# Patient Record
Sex: MALE | Race: WHITE | Employment: UNEMPLOYED | URBAN - METROPOLITAN AREA
[De-identification: names, ages, dates, MRNs, and addresses within clinical notes are randomized per-mention and may not be internally consistent; named-entity substitution may affect disease eponyms.]

---

## 2020-06-18 ENCOUNTER — OFFICE VISIT (OUTPATIENT)
Dept: FAMILY MEDICINE CLINIC | Age: 50
End: 2020-06-18
Payer: COMMERCIAL

## 2020-06-18 VITALS
SYSTOLIC BLOOD PRESSURE: 104 MMHG | HEIGHT: 70 IN | DIASTOLIC BLOOD PRESSURE: 68 MMHG | RESPIRATION RATE: 16 BRPM | BODY MASS INDEX: 22.33 KG/M2 | OXYGEN SATURATION: 99 % | TEMPERATURE: 98 F | WEIGHT: 156 LBS | HEART RATE: 100 BPM

## 2020-06-18 PROCEDURE — 99386 PREV VISIT NEW AGE 40-64: CPT | Performed by: PHYSICIAN ASSISTANT

## 2020-06-18 PROCEDURE — 99406 BEHAV CHNG SMOKING 3-10 MIN: CPT | Performed by: PHYSICIAN ASSISTANT

## 2020-06-18 RX ORDER — VARENICLINE TARTRATE
KIT
Qty: 1 BOX | Refills: 0 | Status: SHIPPED | OUTPATIENT
Start: 2020-06-18 | End: 2020-11-29

## 2020-06-18 RX ORDER — LISDEXAMFETAMINE DIMESYLATE 70 MG/1
CAPSULE ORAL
COMMUNITY
Start: 2020-06-13 | End: 2021-06-29 | Stop reason: SDUPTHER

## 2020-06-18 RX ORDER — VARENICLINE TARTRATE 1 MG/1
1 TABLET, FILM COATED ORAL 2 TIMES DAILY
Qty: 60 TABLET | Refills: 4 | Status: SHIPPED | OUTPATIENT
Start: 2020-06-18 | End: 2020-11-29

## 2020-06-24 PROBLEM — Z00.00 ROUTINE HISTORY AND PHYSICAL EXAMINATION OF ADULT: Status: ACTIVE | Noted: 2020-06-24

## 2020-06-24 PROBLEM — F90.9 ADULT ADHD: Status: ACTIVE | Noted: 2020-06-24

## 2020-06-24 PROBLEM — Z72.0 TOBACCO ABUSE: Status: ACTIVE | Noted: 2020-06-24

## 2020-06-24 ASSESSMENT — ENCOUNTER SYMPTOMS
SHORTNESS OF BREATH: 0
WHEEZING: 0
CHEST TIGHTNESS: 0
COUGH: 0
COLOR CHANGE: 0
TROUBLE SWALLOWING: 0
ABDOMINAL DISTENTION: 0

## 2020-06-29 DIAGNOSIS — Z13.220 SCREENING CHOLESTEROL LEVEL: ICD-10-CM

## 2020-06-29 DIAGNOSIS — Z00.00 ROUTINE HISTORY AND PHYSICAL EXAMINATION OF ADULT: ICD-10-CM

## 2020-06-29 LAB
ALBUMIN SERPL-MCNC: 4 G/DL (ref 3.5–4.6)
ALP BLD-CCNC: 122 U/L (ref 35–104)
ALT SERPL-CCNC: 58 U/L (ref 0–41)
ANION GAP SERPL CALCULATED.3IONS-SCNC: 11 MEQ/L (ref 9–15)
AST SERPL-CCNC: 34 U/L (ref 0–40)
BASOPHILS ABSOLUTE: 0 K/UL (ref 0–0.2)
BASOPHILS RELATIVE PERCENT: 0.4 %
BILIRUB SERPL-MCNC: <0.2 MG/DL (ref 0.2–0.7)
BUN BLDV-MCNC: 9 MG/DL (ref 6–20)
CALCIUM SERPL-MCNC: 9.2 MG/DL (ref 8.5–9.9)
CHLORIDE BLD-SCNC: 100 MEQ/L (ref 95–107)
CHOLESTEROL, TOTAL: 191 MG/DL (ref 0–199)
CO2: 28 MEQ/L (ref 20–31)
CREAT SERPL-MCNC: 0.68 MG/DL (ref 0.7–1.2)
EOSINOPHILS ABSOLUTE: 0 K/UL (ref 0–0.7)
EOSINOPHILS RELATIVE PERCENT: 0.6 %
GFR AFRICAN AMERICAN: >60
GFR NON-AFRICAN AMERICAN: >60
GLOBULIN: 2.9 G/DL (ref 2.3–3.5)
GLUCOSE BLD-MCNC: 101 MG/DL (ref 70–99)
HCT VFR BLD CALC: 45.3 % (ref 42–52)
HDLC SERPL-MCNC: 38 MG/DL (ref 40–59)
HEMOGLOBIN: 15.1 G/DL (ref 14–18)
LDL CHOLESTEROL CALCULATED: 141 MG/DL (ref 0–129)
LYMPHOCYTES ABSOLUTE: 1.8 K/UL (ref 1–4.8)
LYMPHOCYTES RELATIVE PERCENT: 29.3 %
MCH RBC QN AUTO: 30 PG (ref 27–31.3)
MCHC RBC AUTO-ENTMCNC: 33.2 % (ref 33–37)
MCV RBC AUTO: 90.4 FL (ref 80–100)
MONOCYTES ABSOLUTE: 0.3 K/UL (ref 0.2–0.8)
MONOCYTES RELATIVE PERCENT: 5.3 %
NEUTROPHILS ABSOLUTE: 3.9 K/UL (ref 1.4–6.5)
NEUTROPHILS RELATIVE PERCENT: 64.4 %
PDW BLD-RTO: 15.3 % (ref 11.5–14.5)
PLATELET # BLD: 171 K/UL (ref 130–400)
POTASSIUM SERPL-SCNC: 4.1 MEQ/L (ref 3.4–4.9)
RBC # BLD: 5.01 M/UL (ref 4.7–6.1)
SODIUM BLD-SCNC: 139 MEQ/L (ref 135–144)
TOTAL PROTEIN: 6.9 G/DL (ref 6.3–8)
TRIGL SERPL-MCNC: 59 MG/DL (ref 0–150)
WBC # BLD: 6 K/UL (ref 4.8–10.8)

## 2020-07-07 RX ORDER — PRAVASTATIN SODIUM 20 MG
20 TABLET ORAL EVERY EVENING
Qty: 30 TABLET | Refills: 3 | Status: SHIPPED | OUTPATIENT
Start: 2020-07-07 | End: 2020-07-14 | Stop reason: SDUPTHER

## 2020-07-14 RX ORDER — PRAVASTATIN SODIUM 20 MG
20 TABLET ORAL EVERY EVENING
Qty: 30 TABLET | Refills: 3 | Status: SHIPPED | OUTPATIENT
Start: 2020-07-14 | End: 2020-07-16 | Stop reason: SDUPTHER

## 2020-07-14 NOTE — TELEPHONE ENCOUNTER
Rx request   Requested Prescriptions     Pending Prescriptions Disp Refills    pravastatin (PRAVACHOL) 20 MG tablet 30 tablet 3     Sig: Take 1 tablet by mouth every evening     LOV 6/18/2020  Next Visit Date:  Future Appointments   Date Time Provider Vanna Calvo   7/16/2020 11:15 AM DHARMESH Young 96 to the wrong pharmacy

## 2020-07-16 ENCOUNTER — OFFICE VISIT (OUTPATIENT)
Dept: FAMILY MEDICINE CLINIC | Age: 50
End: 2020-07-16
Payer: COMMERCIAL

## 2020-07-16 VITALS
DIASTOLIC BLOOD PRESSURE: 80 MMHG | BODY MASS INDEX: 22.9 KG/M2 | OXYGEN SATURATION: 98 % | HEIGHT: 70 IN | HEART RATE: 83 BPM | WEIGHT: 160 LBS | RESPIRATION RATE: 16 BRPM | SYSTOLIC BLOOD PRESSURE: 120 MMHG | TEMPERATURE: 97.4 F

## 2020-07-16 PROBLEM — F19.10 SUBSTANCE ABUSE (HCC): Status: ACTIVE | Noted: 2020-07-16

## 2020-07-16 PROBLEM — E78.2 MIXED HYPERLIPIDEMIA: Status: ACTIVE | Noted: 2020-07-16

## 2020-07-16 PROCEDURE — 99213 OFFICE O/P EST LOW 20 MIN: CPT | Performed by: PHYSICIAN ASSISTANT

## 2020-07-16 RX ORDER — PRAVASTATIN SODIUM 20 MG
20 TABLET ORAL EVERY EVENING
Qty: 30 TABLET | Refills: 5 | Status: SHIPPED | OUTPATIENT
Start: 2020-07-16 | End: 2021-04-15 | Stop reason: SDUPTHER

## 2020-07-16 RX ORDER — ARIPIPRAZOLE 10 MG/1
TABLET ORAL
COMMUNITY
Start: 2020-07-05 | End: 2021-06-29 | Stop reason: SDUPTHER

## 2020-07-16 RX ORDER — LAMOTRIGINE 100 MG/1
TABLET ORAL
COMMUNITY
Start: 2020-07-05 | End: 2021-07-01 | Stop reason: SDUPTHER

## 2020-07-16 ASSESSMENT — PATIENT HEALTH QUESTIONNAIRE - PHQ9
SUM OF ALL RESPONSES TO PHQ QUESTIONS 1-9: 0
SUM OF ALL RESPONSES TO PHQ QUESTIONS 1-9: 0
1. LITTLE INTEREST OR PLEASURE IN DOING THINGS: 0
SUM OF ALL RESPONSES TO PHQ9 QUESTIONS 1 & 2: 0
2. FEELING DOWN, DEPRESSED OR HOPELESS: 0

## 2020-07-16 ASSESSMENT — ENCOUNTER SYMPTOMS
CHEST TIGHTNESS: 0
WHEEZING: 0
COLOR CHANGE: 0
TROUBLE SWALLOWING: 0
COUGH: 0
ABDOMINAL DISTENTION: 0
SHORTNESS OF BREATH: 0

## 2020-07-16 NOTE — PROGRESS NOTES
Subjective  Lain Medina, 51 y.o. male presents today with:  Chief Complaint   Patient presents with    Discuss Medications     4 week follow up Never received his cholesterol medication     HPI  Phoebe Roy is in the office today for 4 week follow up. Last OV with me: 6/18/2020    Tobacco abuse. Started on chantix therapy 4 weeks ago. Previous tobacco use was heavy--2 PPD. Now down to 8-10 cigarettes/day. Doing well on medication. Denies side effects, adverse reaction. Would like to continue. Mixed hyperlipidemia. Started on pravastatin therapy for abnormal cholesterol. Has not started yet as it was sent to the wrong pharmacy. Needs it reordered today. Substance abuse. 7 years clean. Previously was abusing heroin, opioid pain pills. Asking for a suboxone clinic referral.   Patient is very worried about relapsing. Was previously on medication. No use of pills/illegal substances. Motivated to remain drug free. Lab results.    Results for orders placed or performed in visit on 06/29/20   Lipid Panel   Result Value Ref Range    Cholesterol, Total 191 0 - 199 mg/dL    Triglycerides 59 0 - 150 mg/dL    HDL 38 (L) 40 - 59 mg/dL    LDL Calculated 141 (H) 0 - 129 mg/dL   Comprehensive Metabolic Panel   Result Value Ref Range    Sodium 139 135 - 144 mEq/L    Potassium 4.1 3.4 - 4.9 mEq/L    Chloride 100 95 - 107 mEq/L    CO2 28 20 - 31 mEq/L    Anion Gap 11 9 - 15 mEq/L    Glucose 101 (H) 70 - 99 mg/dL    BUN 9 6 - 20 mg/dL    CREATININE 0.68 (L) 0.70 - 1.20 mg/dL    GFR Non-African American >60.0 >60    GFR  >60.0 >60    Calcium 9.2 8.5 - 9.9 mg/dL    Total Protein 6.9 6.3 - 8.0 g/dL    Alb 4.0 3.5 - 4.6 g/dL    Total Bilirubin <0.2 0.2 - 0.7 mg/dL    Alkaline Phosphatase 122 (H) 35 - 104 U/L    ALT 58 (H) 0 - 41 U/L    AST 34 0 - 40 U/L    Globulin 2.9 2.3 - 3.5 g/dL   CBC Auto Differential   Result Value Ref Range    WBC 6.0 4.8 - 10.8 K/uL    RBC 5.01 4.70 - 6.10 M/uL Hemoglobin 15.1 14.0 - 18.0 g/dL    Hematocrit 45.3 42.0 - 52.0 %    MCV 90.4 80.0 - 100.0 fL    MCH 30.0 27.0 - 31.3 pg    MCHC 33.2 33.0 - 37.0 %    RDW 15.3 (H) 11.5 - 14.5 %    Platelets 451 599 - 366 K/uL    Neutrophils % 64.4 %    Lymphocytes % 29.3 %    Monocytes % 5.3 %    Eosinophils % 0.6 %    Basophils % 0.4 %    Neutrophils Absolute 3.9 1.4 - 6.5 K/uL    Lymphocytes Absolute 1.8 1.0 - 4.8 K/uL    Monocytes Absolute 0.3 0.2 - 0.8 K/uL    Eosinophils Absolute 0.0 0.0 - 0.7 K/uL    Basophils Absolute 0.0 0.0 - 0.2 K/uL     Review of Systems   Constitutional: Negative for activity change, appetite change, chills, diaphoresis, fatigue, fever and unexpected weight change. HENT: Negative for congestion, postnasal drip and trouble swallowing. Eyes: Negative for visual disturbance. Respiratory: Negative for cough, chest tightness, shortness of breath and wheezing. Cardiovascular: Negative for chest pain, palpitations and leg swelling. Gastrointestinal: Negative for abdominal distention. Endocrine: Negative for polydipsia, polyphagia and polyuria. Genitourinary: Negative for difficulty urinating. Musculoskeletal: Negative for arthralgias. Skin: Negative for color change and rash. Neurological: Negative for dizziness, speech difficulty, weakness and light-headedness. Psychiatric/Behavioral: Negative for agitation, dysphoric mood and sleep disturbance. The patient is not nervous/anxious.       Past Medical History:   Diagnosis Date    Anxiety disorder 7/30/2015    Depression 7/30/2015    H/O drug abuse ([facility] [address]     Rehab and detox 4/2013, pain pills     Past Surgical History:   Procedure Laterality Date    CHOLECYSTECTOMY  6-7-13    lapchole with gram    HERNIA REPAIR Right 6/27/13    RIH    OTHER SURGICAL HISTORY Right 6/27/13    Diagnostic Laparoscopy, Repair of RIH with medium light perfix plus system     Social History     Socioeconomic History    Marital status:      Spouse name: Not on file    Number of children: Not on file    Years of education: Not on file    Highest education level: Not on file   Occupational History    Not on file   Social Needs    Financial resource strain: Not on file    Food insecurity     Worry: Not on file     Inability: Not on file    Transportation needs     Medical: Not on file     Non-medical: Not on file   Tobacco Use    Smoking status: Current Every Day Smoker     Packs/day: 1.50     Years: 30.00     Pack years: 45.00     Types: Cigarettes    Smokeless tobacco: Current User   Substance and Sexual Activity    Alcohol use: Not on file    Drug use: Never    Sexual activity: Not on file   Lifestyle    Physical activity     Days per week: Not on file     Minutes per session: Not on file    Stress: Not on file   Relationships    Social connections     Talks on phone: Not on file     Gets together: Not on file     Attends Yarsani service: Not on file     Active member of club or organization: Not on file     Attends meetings of clubs or organizations: Not on file     Relationship status: Not on file    Intimate partner violence     Fear of current or ex partner: Not on file     Emotionally abused: Not on file     Physically abused: Not on file     Forced sexual activity: Not on file   Other Topics Concern    Not on file   Social History Narrative    Not on file     Family History   Problem Relation Age of Onset    Cancer Mother     Depression Mother     Cancer Father         Colon    High Cholesterol Father      No Known Allergies  Current Outpatient Medications   Medication Sig Dispense Refill    ARIPiprazole (ABILIFY) 10 MG tablet       lamoTRIgine (LAMICTAL) 100 MG tablet       pravastatin (PRAVACHOL) 20 MG tablet Take 1 tablet by mouth every evening 30 tablet 5    VYVANSE 70 MG capsule TAKE 1 CAPSULE BY MOUTH EVERY MORNING      varenicline (CHANTIX CONTINUING MONTH ANALY) 1 MG tablet Take 1 tablet by mouth 2 times daily 60 tablet 4    varenicline (CHANTIX STARTING MONTH ANALY) 0.5 MG X 11 & 1 MG X 42 tablet Take by mouth. 1 box 0     No current facility-administered medications for this visit. PMH, Surgical Hx, Family Hx, and Social Hx reviewed and updated. Health Maintenance reviewed. Objective  Vitals:    07/16/20 1128   BP: 120/80   Site: Left Upper Arm   Position: Sitting   Cuff Size: Medium Adult   Pulse: 83   Resp: 16   Temp: 97.4 °F (36.3 °C)   TempSrc: Temporal   SpO2: 98%   Weight: 160 lb (72.6 kg)   Height: 5' 10\" (1.778 m)     BP Readings from Last 3 Encounters:   07/16/20 120/80   06/18/20 104/68   07/30/15 120/84     Wt Readings from Last 3 Encounters:   07/16/20 160 lb (72.6 kg)   06/18/20 156 lb (70.8 kg)   07/30/15 156 lb 8 oz (71 kg)     Physical Exam  Vitals signs reviewed. Constitutional:       General: He is not in acute distress. Appearance: Normal appearance. He is well-developed. He is not ill-appearing or diaphoretic. HENT:      Head: Normocephalic and atraumatic. Right Ear: Tympanic membrane, ear canal and external ear normal.      Left Ear: Tympanic membrane, ear canal and external ear normal.      Nose: Nose normal.      Mouth/Throat:      Mouth: Mucous membranes are moist.   Eyes:      Conjunctiva/sclera: Conjunctivae normal.   Neck:      Musculoskeletal: Normal range of motion. Cardiovascular:      Rate and Rhythm: Normal rate and regular rhythm. Pulmonary:      Effort: Pulmonary effort is normal.      Breath sounds: Normal breath sounds. Abdominal:      General: Abdomen is flat. Musculoskeletal: Normal range of motion. Skin:     General: Skin is warm and dry. Neurological:      General: No focal deficit present. Mental Status: He is alert and oriented to person, place, and time. Cranial Nerves: No cranial nerve deficit. Psychiatric:         Mood and Affect: Mood normal.         Behavior: Behavior normal.         Thought Content:  Thought content normal. Judgment: Judgment normal.      Comments: Opened up about his history of drug/opioid abuse. Very motivated to stay clean/sober. Asking for any advise/referral to suboxone clinic. Assessment & Plan   Krista Coronel was seen today for discuss medications. Diagnoses and all orders for this visit:    Substance abuse [facility])  Comments:  Discussed history; patient very motivated to stay sober. Provided number to addiction outreach and Dr. Makeda Fuentes in [place], [place]. Mixed hyperlipidemia  -     pravastatin (PRAVACHOL) 20 MG tablet; Take 1 tablet by mouth every evening    Tobacco abuse  Comments:  Continue chantix. Doing well. Repeat cholesterol in 12 weeks. Orders Placed This Encounter   Medications    pravastatin (PRAVACHOL) 20 MG tablet     Sig: Take 1 tablet by mouth every evening     Dispense:  30 tablet     Refill:  5     Medications Discontinued During This Encounter   Medication Reason    pravastatin (PRAVACHOL) 20 MG tablet REORDER     Return in about 3 months (around 10/16/2020) for follow up. Reviewed with the patient: current clinical status, medications, activities and diet. Side effects, adverse effects of the medication prescribed today, as well as treatment plan/ rationale and result expectations have been discussed with the patient who expresses understanding and desires to proceed. Close follow up to evaluate treatment results and for coordination of care. I have reviewed the patient's medical history in detail and updated the computerized patient record.     Pati Mustafa PA-C

## 2020-07-24 PROBLEM — Z00.00 ROUTINE HISTORY AND PHYSICAL EXAMINATION OF ADULT: Status: RESOLVED | Noted: 2020-06-24 | Resolved: 2020-07-24

## 2021-04-15 ENCOUNTER — OFFICE VISIT (OUTPATIENT)
Dept: FAMILY MEDICINE CLINIC | Age: 51
End: 2021-04-15
Payer: COMMERCIAL

## 2021-04-15 VITALS
HEIGHT: 71 IN | WEIGHT: 167 LBS | TEMPERATURE: 97.3 F | DIASTOLIC BLOOD PRESSURE: 80 MMHG | SYSTOLIC BLOOD PRESSURE: 132 MMHG | RESPIRATION RATE: 18 BRPM | HEART RATE: 94 BPM | BODY MASS INDEX: 23.38 KG/M2 | OXYGEN SATURATION: 100 %

## 2021-04-15 DIAGNOSIS — R60.0 LOWER EXTREMITY EDEMA: Primary | ICD-10-CM

## 2021-04-15 DIAGNOSIS — R20.2 NUMBNESS AND TINGLING OF BOTH FEET: ICD-10-CM

## 2021-04-15 DIAGNOSIS — B35.1 ONYCHOMYCOSIS OF TOENAIL: ICD-10-CM

## 2021-04-15 DIAGNOSIS — R20.0 NUMBNESS AND TINGLING OF BOTH FEET: ICD-10-CM

## 2021-04-15 DIAGNOSIS — E78.2 MIXED HYPERLIPIDEMIA: ICD-10-CM

## 2021-04-15 DIAGNOSIS — R06.02 SOB (SHORTNESS OF BREATH): ICD-10-CM

## 2021-04-15 DIAGNOSIS — B35.3 TINEA PEDIS OF BOTH FEET: ICD-10-CM

## 2021-04-15 DIAGNOSIS — F39 MOOD DISORDER (HCC): ICD-10-CM

## 2021-04-15 LAB
ALBUMIN SERPL-MCNC: 4.4 G/DL (ref 3.5–4.6)
ALP BLD-CCNC: 106 U/L (ref 35–104)
ALT SERPL-CCNC: 23 U/L (ref 0–41)
ANION GAP SERPL CALCULATED.3IONS-SCNC: 12 MEQ/L (ref 9–15)
AST SERPL-CCNC: 19 U/L (ref 0–40)
BASOPHILS ABSOLUTE: 0.1 K/UL (ref 0–0.2)
BASOPHILS RELATIVE PERCENT: 0.6 %
BILIRUB SERPL-MCNC: <0.2 MG/DL (ref 0.2–0.7)
BUN BLDV-MCNC: 5 MG/DL (ref 6–20)
CALCIUM SERPL-MCNC: 9.5 MG/DL (ref 8.5–9.9)
CHLORIDE BLD-SCNC: 103 MEQ/L (ref 95–107)
CHOLESTEROL, TOTAL: 140 MG/DL (ref 0–199)
CO2: 25 MEQ/L (ref 20–31)
CREAT SERPL-MCNC: 0.6 MG/DL (ref 0.7–1.2)
EOSINOPHILS ABSOLUTE: 0 K/UL (ref 0–0.7)
EOSINOPHILS RELATIVE PERCENT: 0.4 %
FOLATE: <2 NG/ML (ref 7.3–26.1)
GFR AFRICAN AMERICAN: >60
GFR NON-AFRICAN AMERICAN: >60
GLOBULIN: 2.6 G/DL (ref 2.3–3.5)
GLUCOSE BLD-MCNC: 103 MG/DL (ref 70–99)
HCT VFR BLD CALC: 45.6 % (ref 42–52)
HDLC SERPL-MCNC: 50 MG/DL (ref 40–59)
HEMOGLOBIN: 15.6 G/DL (ref 14–18)
LDL CHOLESTEROL CALCULATED: 80 MG/DL (ref 0–129)
LYMPHOCYTES ABSOLUTE: 2.8 K/UL (ref 1–4.8)
LYMPHOCYTES RELATIVE PERCENT: 26 %
MCH RBC QN AUTO: 30.4 PG (ref 27–31.3)
MCHC RBC AUTO-ENTMCNC: 34.1 % (ref 33–37)
MCV RBC AUTO: 89.2 FL (ref 80–100)
MONOCYTES ABSOLUTE: 0.6 K/UL (ref 0.2–0.8)
MONOCYTES RELATIVE PERCENT: 5.2 %
NEUTROPHILS ABSOLUTE: 7.3 K/UL (ref 1.4–6.5)
NEUTROPHILS RELATIVE PERCENT: 67.8 %
PDW BLD-RTO: 13.5 % (ref 11.5–14.5)
PLATELET # BLD: 197 K/UL (ref 130–400)
POTASSIUM SERPL-SCNC: 3.9 MEQ/L (ref 3.4–4.9)
RBC # BLD: 5.11 M/UL (ref 4.7–6.1)
SODIUM BLD-SCNC: 140 MEQ/L (ref 135–144)
TOTAL PROTEIN: 7 G/DL (ref 6.3–8)
TRIGL SERPL-MCNC: 51 MG/DL (ref 0–150)
VITAMIN B-12: 1134 PG/ML (ref 232–1245)
WBC # BLD: 10.8 K/UL (ref 4.8–10.8)

## 2021-04-15 PROCEDURE — 99215 OFFICE O/P EST HI 40 MIN: CPT | Performed by: PHYSICIAN ASSISTANT

## 2021-04-15 RX ORDER — PRAVASTATIN SODIUM 20 MG
20 TABLET ORAL EVERY EVENING
Qty: 90 TABLET | Refills: 4 | Status: SHIPPED | OUTPATIENT
Start: 2021-04-15 | End: 2022-01-25

## 2021-04-15 RX ORDER — CLOTRIMAZOLE AND BETAMETHASONE DIPROPIONATE 10; .64 MG/G; MG/G
CREAM TOPICAL
Qty: 45 G | Refills: 1 | Status: SHIPPED | OUTPATIENT
Start: 2021-04-15 | End: 2021-08-11

## 2021-04-15 RX ORDER — CLONIDINE HYDROCHLORIDE 0.1 MG/1
0.1 TABLET ORAL NIGHTLY
COMMUNITY
End: 2021-06-29 | Stop reason: SDUPTHER

## 2021-04-15 RX ORDER — HYDROCHLOROTHIAZIDE 12.5 MG/1
12.5 CAPSULE, GELATIN COATED ORAL EVERY MORNING
Qty: 30 CAPSULE | Refills: 1 | Status: SHIPPED | OUTPATIENT
Start: 2021-04-15 | End: 2021-04-29 | Stop reason: SDUPTHER

## 2021-04-15 SDOH — ECONOMIC STABILITY: FOOD INSECURITY: WITHIN THE PAST 12 MONTHS, THE FOOD YOU BOUGHT JUST DIDN'T LAST AND YOU DIDN'T HAVE MONEY TO GET MORE.: NOT ASKED

## 2021-04-15 SDOH — ECONOMIC STABILITY: INCOME INSECURITY: HOW HARD IS IT FOR YOU TO PAY FOR THE VERY BASICS LIKE FOOD, HOUSING, MEDICAL CARE, AND HEATING?: NOT HARD AT ALL

## 2021-04-15 SDOH — ECONOMIC STABILITY: FOOD INSECURITY: WITHIN THE PAST 12 MONTHS, YOU WORRIED THAT YOUR FOOD WOULD RUN OUT BEFORE YOU GOT MONEY TO BUY MORE.: NOT ASKED

## 2021-04-15 SDOH — ECONOMIC STABILITY: TRANSPORTATION INSECURITY
IN THE PAST 12 MONTHS, HAS THE LACK OF TRANSPORTATION KEPT YOU FROM MEDICAL APPOINTMENTS OR FROM GETTING MEDICATIONS?: NOT ASKED

## 2021-04-15 ASSESSMENT — PATIENT HEALTH QUESTIONNAIRE - PHQ9
SUM OF ALL RESPONSES TO PHQ QUESTIONS 1-9: 0
2. FEELING DOWN, DEPRESSED OR HOPELESS: 0

## 2021-04-15 ASSESSMENT — ENCOUNTER SYMPTOMS
SHORTNESS OF BREATH: 0
WHEEZING: 0
ABDOMINAL DISTENTION: 0
COUGH: 0
ABDOMINAL PAIN: 0
TROUBLE SWALLOWING: 0
COLOR CHANGE: 1
CHEST TIGHTNESS: 0

## 2021-04-15 NOTE — PROGRESS NOTES
for seizures, speech difficulty and weakness. Psychiatric/Behavioral: Negative for agitation, behavioral problems, confusion, dysphoric mood and sleep disturbance. The patient is not nervous/anxious.       Past Medical History:   Diagnosis Date    Anxiety disorder 7/30/2015    Depression 7/30/2015    H/O drug abuse ([facility] [address]     Rehab and detox 4/2013, pain pills     Past Surgical History:   Procedure Laterality Date    CHOLECYSTECTOMY  6-7-13    lapchole with gram    HERNIA REPAIR Right 6/27/13    RIH    OTHER SURGICAL HISTORY Right 6/27/13    Diagnostic Laparoscopy, Repair of RIH with medium light perfix plus system     Social History     Socioeconomic History    Marital status:      Spouse name: Not on file    Number of children: Not on file    Years of education: Not on file    Highest education level: Not on file   Occupational History    Not on file   Social Needs    Financial resource strain: Not hard at all   Radha-Lorene insecurity     Worry: Not on file     Inability: Not on file   FNZ needs     Medical: Not on file     Non-medical: Not on file   Tobacco Use    Smoking status: Current Every Day Smoker     Packs/day: 1.50     Years: 30.00     Pack years: 45.00     Types: Cigarettes    Smokeless tobacco: Current User   Substance and Sexual Activity    Alcohol use: Not on file    Drug use: Never    Sexual activity: Not on file   Lifestyle    Physical activity     Days per week: Not on file     Minutes per session: Not on file    Stress: Not on file   Relationships    Social connections     Talks on phone: Not on file     Gets together: Not on file     Attends Anabaptist service: Not on file     Active member of club or organization: Not on file     Attends meetings of clubs or organizations: Not on file     Relationship status: Not on file    Intimate partner violence     Fear of current or ex partner: Not on file     Emotionally abused: Not on file     Physically abused: Not on file     Forced sexual activity: Not on file   Other Topics Concern    Not on file   Social History Narrative    Not on file     Family History   Problem Relation Age of Onset    Cancer Mother     Depression Mother     Cancer Father         Colon    High Cholesterol Father      No Known Allergies  Current Outpatient Medications   Medication Sig Dispense Refill    cloNIDine (CATAPRES) 0.1 MG tablet Take 0.1 mg by mouth nightly      pravastatin (PRAVACHOL) 20 MG tablet Take 1 tablet by mouth every evening 90 tablet 4    hydroCHLOROthiazide (MICROZIDE) 12.5 MG capsule Take 1 capsule by mouth every morning 30 capsule 1    clotrimazole-betamethasone (LOTRISONE) 1-0.05 % cream Apply topically 2 times daily to feet. 45 g 1    ARIPiprazole (ABILIFY) 10 MG tablet       lamoTRIgine (LAMICTAL) 100 MG tablet       VYVANSE 70 MG capsule TAKE 1 CAPSULE BY MOUTH EVERY MORNING       No current facility-administered medications for this visit. PMH, Surgical Hx, Family Hx, and Social Hx reviewed and updated. Health Maintenance reviewed. Objective  Vitals:    04/15/21 1113   BP: 132/80   Pulse: 94   Resp: 18   Temp: 97.3 °F (36.3 °C)   TempSrc: Temporal   SpO2: 100%   Weight: 167 lb (75.8 kg)   Height: 5' 11\" (1.803 m)     BP Readings from Last 3 Encounters:   04/15/21 132/80   07/16/20 120/80   06/18/20 104/68     Wt Readings from Last 3 Encounters:   04/15/21 167 lb (75.8 kg)   07/16/20 160 lb (72.6 kg)   06/18/20 156 lb (70.8 kg)     Physical Exam  Vitals signs reviewed. Constitutional:       General: He is not in acute distress. Appearance: Normal appearance. He is not ill-appearing or toxic-appearing. HENT:      Head: Normocephalic and atraumatic. Right Ear: External ear normal.      Left Ear: External ear normal.      Nose: Nose normal.   Eyes:      Conjunctiva/sclera: Conjunctivae normal.   Cardiovascular:      Rate and Rhythm: Normal rate and regular rhythm. Heart sounds: No murmur. Pulmonary:      Effort: Pulmonary effort is normal.      Breath sounds: Normal breath sounds. No stridor. No wheezing or rhonchi. Musculoskeletal:         General: No tenderness. Right lower leg: Edema (2+ ) present. Left lower leg: Edema (2+) present. Feet:      Right foot:      Skin integrity: Skin breakdown and erythema present. Toenail Condition: Right toenails are abnormally thick and long. Fungal disease present. Left foot:      Skin integrity: Skin breakdown and erythema present. Toenail Condition: Left toenails are abnormally thick and long. Fungal disease present. Skin:     General: Skin is warm. Coloration: Skin is not pale. Findings: Erythema (bilateral feet with coolness noted to toes) and rash (desquamation of plantar surface, bilateral feet) present. Neurological:      General: No focal deficit present. Mental Status: He is alert and oriented to person, place, and time. Sensory: No sensory deficit. Motor: No weakness. Coordination: Coordination normal.      Gait: Gait normal.       Assessment & Plan   Sue Restrepo was seen today for leg swelling. Diagnoses and all orders for this visit:    Lower extremity edema  -     Farida Covington, NP, Interventional Radiology, [place]  -     hydroCHLOROthiazide (MICROZIDE) 12.5 MG capsule; Take 1 capsule by mouth every morning  -     ECHO Complete 2D W Doppler W Color; Future    SOB (shortness of breath)  -     ECHO Complete 2D W Doppler W Color; Future    Mixed hyperlipidemia  -     pravastatin (PRAVACHOL) 20 MG tablet; Take 1 tablet by mouth every evening  -     CBC Auto Differential; Future  -     Comprehensive Metabolic Panel; Future  -     Lipid Panel; Future    Tinea pedis of both feet  -     clotrimazole-betamethasone (LOTRISONE) 1-0.05 % cream; Apply topically 2 times daily to feet.     Onychomycosis of toenail  -     [facility] - Christiano Van DPM, Podiatry, [place]    Numbness and tingling of both feet  -     Vitamin B12 & Folate; Future    BMI 23.0-23.9, adult  -     CBC Auto Differential; Future  -     Comprehensive Metabolic Panel; Future  -     Lipid Panel; Future    Mood disorder ([address]    Further work-up today. Echocardiogram, IR referral.  Modify sodium intake. 2 week follow up with me.      Orders Placed This Encounter   Procedures    CBC Auto Differential     Standing Status:   Future     Number of Occurrences:   1     Standing Expiration Date:   4/15/2022    Comprehensive Metabolic Panel     Standing Status:   Future     Number of Occurrences:   1     Standing Expiration Date:   4/15/2022    Lipid Panel     Standing Status:   Future     Number of Occurrences:   1     Standing Expiration Date:   4/15/2022     Order Specific Question:   Is Patient Fasting?/# of Hours     Answer:   8+ hours    Vitamin B12 & Folate     Standing Status:   Future     Number of Occurrences:   1     Standing Expiration Date:   4/15/2022   [facility] - Ade Castro NP, Interventional Radiology, [place]     Referral Priority:   Routine     Referral Type:   Eval and Treat     Referral Reason:   Specialty Services Required     Referred to Provider:   SUBHA Orantes CNP     Requested Specialty:   Nurse Practitioner     Number of Visits Requested:   1    [facility] - Anita Rangel DPM, Podiatry, [place]     Referral Priority:   Routine     Referral Type:   Eval and Treat     Referral Reason:   Specialty Services Required     Referred to Provider:   Kourtney Sahu DPM     Requested Specialty:   Podiatry     Number of Visits Requested:   1    ECHO Complete 2D W Doppler W Color     Standing Status:   Future     Standing Expiration Date:   4/15/2022     Order Specific Question:   Reason for exam:     Answer:   BLE x 2-3 months, intermittent SOB     Orders Placed This Encounter   Medications    pravastatin (PRAVACHOL) 20 MG tablet     Sig: Take 1 tablet by mouth every evening     Dispense:  90 tablet     Refill:  4  hydroCHLOROthiazide (MICROZIDE) 12.5 MG capsule     Sig: Take 1 capsule by mouth every morning     Dispense:  30 capsule     Refill:  1    clotrimazole-betamethasone (LOTRISONE) 1-0.05 % cream     Sig: Apply topically 2 times daily to feet. Dispense:  45 g     Refill:  1     Medications Discontinued During This Encounter   Medication Reason    CHANTIX CONTINUING MONTH ANALY 1 MG tablet LIST CLEANUP    pravastatin (PRAVACHOL) 20 MG tablet REORDER     Return in about 2 weeks (around 4/29/2021) for follow up in office. Reviewed with the patient: current clinical status, medications, activities and diet. Side effects, adverse effects of the medication prescribed today, as well as treatment plan/ rationale and result expectations have been discussed with the patient who expresses understanding and desires to proceed. Close follow up to evaluate treatment results and for coordination of care. I have reviewed the patient's medical history in detail and updated the computerized patient record.     Pati Mustafa PA-C

## 2021-04-16 DIAGNOSIS — E53.8 FOLATE DEFICIENCY: Primary | ICD-10-CM

## 2021-04-16 RX ORDER — FOLIC ACID 1 MG/1
1 TABLET ORAL DAILY
Qty: 90 TABLET | Refills: 2 | Status: SHIPPED | OUTPATIENT
Start: 2021-04-16 | End: 2022-01-21

## 2021-04-21 ENCOUNTER — HOSPITAL ENCOUNTER (OUTPATIENT)
Dept: NON INVASIVE DIAGNOSTICS | Age: 51
Discharge: HOME | End: 2021-04-21
Payer: COMMERCIAL

## 2021-04-21 DIAGNOSIS — R06.02 SOB (SHORTNESS OF BREATH): ICD-10-CM

## 2021-04-21 DIAGNOSIS — R60.0 LOWER EXTREMITY EDEMA: ICD-10-CM

## 2021-04-21 LAB
LV EF: 55 %
LVEF MODALITY: NORMAL

## 2021-04-21 PROCEDURE — 93306 TTE W/DOPPLER COMPLETE: CPT

## 2021-04-29 ENCOUNTER — OFFICE VISIT (OUTPATIENT)
Dept: FAMILY MEDICINE CLINIC | Age: 51
End: 2021-04-29
Payer: COMMERCIAL

## 2021-04-29 VITALS
DIASTOLIC BLOOD PRESSURE: 76 MMHG | WEIGHT: 164 LBS | HEIGHT: 70 IN | SYSTOLIC BLOOD PRESSURE: 118 MMHG | OXYGEN SATURATION: 98 % | HEART RATE: 86 BPM | BODY MASS INDEX: 23.48 KG/M2 | TEMPERATURE: 98 F | RESPIRATION RATE: 18 BRPM

## 2021-04-29 DIAGNOSIS — H10.13 ALLERGIC CONJUNCTIVITIS OF BOTH EYES: ICD-10-CM

## 2021-04-29 DIAGNOSIS — R20.0 NUMBNESS AND TINGLING OF BOTH FEET: ICD-10-CM

## 2021-04-29 DIAGNOSIS — R60.0 LOWER EXTREMITY EDEMA: Primary | ICD-10-CM

## 2021-04-29 DIAGNOSIS — R20.2 NUMBNESS AND TINGLING OF BOTH FEET: ICD-10-CM

## 2021-04-29 DIAGNOSIS — Z72.0 TOBACCO ABUSE: ICD-10-CM

## 2021-04-29 DIAGNOSIS — E53.8 FOLATE DEFICIENCY: ICD-10-CM

## 2021-04-29 PROBLEM — R06.02 SOB (SHORTNESS OF BREATH): Status: RESOLVED | Noted: 2021-04-15 | Resolved: 2021-04-29

## 2021-04-29 PROCEDURE — 99214 OFFICE O/P EST MOD 30 MIN: CPT | Performed by: PHYSICIAN ASSISTANT

## 2021-04-29 RX ORDER — HYDROCHLOROTHIAZIDE 12.5 MG/1
12.5 CAPSULE, GELATIN COATED ORAL EVERY MORNING
Qty: 90 CAPSULE | Refills: 1 | Status: SHIPPED | OUTPATIENT
Start: 2021-04-29 | End: 2021-10-22

## 2021-04-29 RX ORDER — OLOPATADINE HYDROCHLORIDE 2 MG/ML
SOLUTION/ DROPS OPHTHALMIC
Qty: 2.5 ML | Refills: 1 | Status: SHIPPED | OUTPATIENT
Start: 2021-04-29 | End: 2021-07-30

## 2021-04-29 ASSESSMENT — ENCOUNTER SYMPTOMS
CHEST TIGHTNESS: 0
WHEEZING: 0
COUGH: 0
ABDOMINAL PAIN: 0
SHORTNESS OF BREATH: 0
ABDOMINAL DISTENTION: 0
TROUBLE SWALLOWING: 0
COLOR CHANGE: 1

## 2021-04-29 NOTE — PROGRESS NOTES
Subjective  Lashaun Brower, 53 y.o. male presents today with:  Chief Complaint   Patient presents with    Leg Swelling     2 week follow up patient states he is improving after HCTZ 12.5 Mg daily      HPI  Asiya Irby is in the office today for 2 week follow up. Last OV with me: 4/15/2021    LE edema. Was seen 2 weeks ago for worsening leg swelling. Echocardiogram ordered--normal.   IR referral--has appointment on 5/12 with Keesha Shelton CNP. Was started on 12.5 mg HCTZ. Has also reduced sodium intake. Noted great improvement in swelling. Folate def. Started on replacement therapy. Tobacco abuse. Restarted chantix therapy. Tolerating. Mild nausea after taking. Not eating with morning dose. Pruritis, eye/irritation  Seasonal, waking up with itchy, injected eyes. Seems to be worse with weather change. Denies FB sensation, drainage. Denies blurry vision/changes. Eyes are \"burning and irritated\".      Results for orders placed or performed in visit on 04/15/21   CBC Auto Differential   Result Value Ref Range    WBC 10.8 4.8 - 10.8 K/uL    RBC 5.11 4.70 - 6.10 M/uL    Hemoglobin 15.6 14.0 - 18.0 g/dL    Hematocrit 45.6 42.0 - 52.0 %    MCV 89.2 80.0 - 100.0 fL    MCH 30.4 27.0 - 31.3 pg    MCHC 34.1 33.0 - 37.0 %    RDW 13.5 11.5 - 14.5 %    Platelets 761 196 - 372 K/uL    Neutrophils % 67.8 %    Lymphocytes % 26.0 %    Monocytes % 5.2 %    Eosinophils % 0.4 %    Basophils % 0.6 %    Neutrophils Absolute 7.3 (H) 1.4 - 6.5 K/uL    Lymphocytes Absolute 2.8 1.0 - 4.8 K/uL    Monocytes Absolute 0.6 0.2 - 0.8 K/uL    Eosinophils Absolute 0.0 0.0 - 0.7 K/uL    Basophils Absolute 0.1 0.0 - 0.2 K/uL   Comprehensive Metabolic Panel   Result Value Ref Range    Sodium 140 135 - 144 mEq/L    Potassium 3.9 3.4 - 4.9 mEq/L    Chloride 103 95 - 107 mEq/L    CO2 25 20 - 31 mEq/L    Anion Gap 12 9 - 15 mEq/L    Glucose 103 (H) 70 - 99 mg/dL    BUN 5 (L) 6 - 20 mg/dL    CREATININE 0.60 (L) 0.70 - 1.20 mg/dL    GFR Non- >60.0 >60    GFR  >60.0 >60    Calcium 9.5 8.5 - 9.9 mg/dL    Total Protein 7.0 6.3 - 8.0 g/dL    Albumin 4.4 3.5 - 4.6 g/dL    Total Bilirubin <0.2 0.2 - 0.7 mg/dL    Alkaline Phosphatase 106 (H) 35 - 104 U/L    ALT 23 0 - 41 U/L    AST 19 0 - 40 U/L    Globulin 2.6 2.3 - 3.5 g/dL   Lipid Panel   Result Value Ref Range    Cholesterol, Total 140 0 - 199 mg/dL    Triglycerides 51 0 - 150 mg/dL    HDL 50 40 - 59 mg/dL    LDL Calculated 80 0 - 129 mg/dL   Vitamin B12 & Folate   Result Value Ref Range    Vitamin B-12 1134 232 - 1245 pg/mL    Folate <2.0 (L) 7.3 - 26.1 ng/mL     Review of Systems   Constitutional: Negative for activity change, appetite change, chills, diaphoresis, fatigue and fever. HENT: Negative for congestion, dental problem and trouble swallowing. Eyes: Negative for visual disturbance. Respiratory: Negative for cough, chest tightness, shortness of breath and wheezing. Cardiovascular: Positive for leg swelling (IMPROVED). Negative for chest pain and palpitations. Gastrointestinal: Negative for abdominal distention and abdominal pain. Genitourinary: Negative for difficulty urinating, dysuria, frequency, hematuria, penile pain, penile swelling, scrotal swelling, testicular pain and urgency. Skin: Positive for color change. Negative for pallor. Neurological: Positive for numbness (feet). Negative for seizures, speech difficulty and weakness. Psychiatric/Behavioral: Negative for agitation, behavioral problems, confusion, dysphoric mood and sleep disturbance. The patient is not nervous/anxious.         Past Medical History:   Diagnosis Date    Anxiety disorder 7/30/2015    Depression 7/30/2015    H/O drug abuse ([address]     Rehab and detox 4/2013, pain pills     Past Surgical History:   Procedure Laterality Date    CHOLECYSTECTOMY  6-7-13    lapchole with gram    [facility]ERN[facility]PA[facility]    OTHER SURGICAL HISTORY Right 6/27/13 Diagnostic Laparoscopy, Repair of RIH with medium light perfix plus system     Social History     Socioeconomic History    Marital status:      Spouse name: Not on file    Number of children: Not on file    Years of education: Not on file    Highest education level: Not on file   Occupational History    Not on file   Social Needs    Financial resource strain: Not hard at all   [employer] insecurity     Worry: Not on file     Inability: Not on file    Transportation needs     Medical: Not on file     Non-medical: Not on file   Tobacco Use    Smoking status: Current Every Day Smoker     Packs/day: 1.50     Years: 30.00     Pack years: 45.00     Types: Cigarettes    Smokeless tobacco: Current User   Substance and Sexual Activity    Alcohol use: Not on file    Drug use: Never    Sexual activity: Not on file   Lifestyle    Physical activity     Days per week: Not on file     Minutes per session: Not on file    Stress: Not on file   Relationships    Social connections     Talks on phone: Not on file     Gets together: Not on file     Attends Congregational service: Not on file     Active member of club or organization: Not on file     Attends meetings of clubs or organizations: Not on file     Relationship status: Not on file    Intimate partner violence     Fear of current or ex partner: Not on file     Emotionally abused: Not on file     Physically abused: Not on file     Forced sexual activity: Not on file   Other Topics Concern    Not on file   Social History Narrative    Not on file     Family History   Problem Relation Age of Onset    Cancer Mother     Depression Mother     Cancer Father         Colon    High Cholesterol Father      No Known Allergies  Current Outpatient Medications   Medication Sig Dispense Refill    hydroCHLOROthiazide (MICROZIDE) 12.5 MG capsule Take 1 capsule by mouth every morning 90 capsule 1    olopatadine (PATADAY) 0.2 % SOLN ophthalmic solution Instill 1 drop in each eye daily. 2.5 mL 1    folic acid (FOLVITE) 1 MG tablet Take 1 tablet by mouth daily 90 tablet 2    cloNIDine (CATAPRES) 0.1 MG tablet Take 0.1 mg by mouth nightly      pravastatin (PRAVACHOL) 20 MG tablet Take 1 tablet by mouth every evening 90 tablet 4    clotrimazole-betamethasone (LOTRISONE) 1-0.05 % cream Apply topically 2 times daily to feet. 45 g 1    ARIPiprazole (ABILIFY) 10 MG tablet       lamoTRIgine (LAMICTAL) 100 MG tablet       VYVANSE 70 MG capsule TAKE 1 CAPSULE BY MOUTH EVERY MORNING       No current facility-administered medications for this visit. PMH, Surgical Hx, Family Hx, and Social Hx reviewed and updated. Health Maintenance reviewed. Objective  Vitals:    04/29/21 0808   BP: 118/76   Pulse: 86   Resp: 18   Temp: 98 °F (36.7 °C)   TempSrc: Temporal   SpO2: 98%   Weight: 164 lb (74.4 kg)   Height: 5' 10\" (1.778 m)     BP Readings from Last 3 Encounters:   04/29/21 118/76   04/15/21 132/80   07/16/20 120/80     Wt Readings from Last 3 Encounters:   04/29/21 164 lb (74.4 kg)   04/15/21 167 lb (75.8 kg)   07/16/20 160 lb (72.6 kg)     Physical Exam  Vitals signs reviewed. Constitutional:       General: He is not in acute distress. Appearance: Normal appearance. He is not ill-appearing or toxic-appearing. HENT:      Head: Normocephalic and atraumatic. Right Ear: External ear normal.      Left Ear: External ear normal.      Nose: Nose normal.   Eyes:      Conjunctiva/sclera: Conjunctivae normal.   Cardiovascular:      Rate and Rhythm: Normal rate and regular rhythm. Heart sounds: No murmur. Pulmonary:      Effort: Pulmonary effort is normal.      Breath sounds: Normal breath sounds. No stridor. No wheezing or rhonchi. Musculoskeletal:         General: No tenderness. Right lower leg: Edema (trace, improved) present. Left lower leg: Edema (trace, improved) present. Feet:      Right foot:      Skin integrity: Skin breakdown and erythema present. Toenail Condition: Right toenails are abnormally thick and long. Fungal disease present. Left foot:      Skin integrity: Skin breakdown and erythema present. Toenail Condition: Left toenails are abnormally thick and long. Fungal disease present. Skin:     General: Skin is warm. Coloration: Skin is not pale. Findings: No erythema or rash. Neurological:      General: No focal deficit present. Mental Status: He is alert and oriented to person, place, and time. Sensory: No sensory deficit. Motor: No weakness. Coordination: Coordination normal.      Gait: Gait normal.       Assessment & Plan   Neville Harada was seen today for leg swelling. Diagnoses and all orders for this visit:    Lower extremity edema  Comments:  Improved with Na reduction. Continue 12.5 mg hctz. Orders:  -     hydroCHLOROthiazide (MICROZIDE) 12.5 MG capsule; Take 1 capsule by mouth every morning    Allergic conjunctivitis of both eyes  Comments:  start eye drop   Orders:  -     olopatadine (PATADAY) 0.2 % SOLN ophthalmic solution; Instill 1 drop in each eye daily. Tobacco abuse  Comments:  restarted chantix. Numbness and tingling of both feet  Comments:  Has IR consult 5/12. Monitor if improvement with folate replacement. Folate deficiency  Comments:  Started replacement. 3 month follow up. Orders Placed This Encounter   Medications    hydroCHLOROthiazide (MICROZIDE) 12.5 MG capsule     Sig: Take 1 capsule by mouth every morning     Dispense:  90 capsule     Refill:  1    olopatadine (PATADAY) 0.2 % SOLN ophthalmic solution     Sig: Instill 1 drop in each eye daily. Dispense:  2.5 mL     Refill:  1     Medications Discontinued During This Encounter   Medication Reason    hydroCHLOROthiazide (MICROZIDE) 12.5 MG capsule REORDER     No follow-ups on file. Reviewed with the patient: current clinical status, medications, activities and diet.      Side effects, adverse effects of the

## 2021-05-12 ENCOUNTER — INITIAL CONSULT (OUTPATIENT)
Dept: INTERVENTIONAL RADIOLOGY/VASCULAR | Age: 51
End: 2021-05-12
Payer: COMMERCIAL

## 2021-05-12 VITALS
BODY MASS INDEX: 23.53 KG/M2 | DIASTOLIC BLOOD PRESSURE: 76 MMHG | HEART RATE: 89 BPM | WEIGHT: 164 LBS | SYSTOLIC BLOOD PRESSURE: 118 MMHG

## 2021-05-12 DIAGNOSIS — I83.93 ASYMPTOMATIC VARICOSE VEINS OF LOWER EXTREMITY WITHOUT COMPLICATION, BILATERAL: ICD-10-CM

## 2021-05-12 DIAGNOSIS — R60.0 BILATERAL LEG EDEMA: Primary | ICD-10-CM

## 2021-05-12 PROCEDURE — 99244 OFF/OP CNSLTJ NEW/EST MOD 40: CPT | Performed by: NURSE PRACTITIONER

## 2021-05-12 ASSESSMENT — ENCOUNTER SYMPTOMS
NAUSEA: 0
BACK PAIN: 0
SHORTNESS OF BREATH: 0
GASTROINTESTINAL NEGATIVE: 1
RESPIRATORY NEGATIVE: 1
WHEEZING: 0
TROUBLE SWALLOWING: 0
EYES NEGATIVE: 1
ABDOMINAL PAIN: 0
COUGH: 0
ABDOMINAL DISTENTION: 0
DIARRHEA: 0
SORE THROAT: 0
VOMITING: 0
CONSTIPATION: 0

## 2021-05-12 NOTE — PROGRESS NOTES
Lily Rizzo, a male of 53 y.o. came to the office 5/12/2021. No chief complaint on file. 5/12/2021 HPI: Lily Rizzo is a pleasant male who presents to clinic for consultation at the request of PCP for evaluation of LE edema. Patient presents with symptoms of: mild lower leg chronic daily edema. Onset x 2 months. Placed on diuretic with improvement. Today is minimal edema. States he eats high sodium diet. Denies any LE pain, aching, fatigue or heaviness sensation. There are scattered small varicose veins to both legs that are asymptomatic. Leg elevation: daily with relief. Stocking use and dates: Has never done conservative therapy with compression stockings.    Denies claudication      Family History   Problem Relation Age of Onset    Cancer Mother     Depression Mother     Cancer Father         Colon    High Cholesterol Father        Past Surgical History:   Procedure Laterality Date    CHOLECYSTECTOMY  6-7-13    lapchole with gram    HERNIA REPAIR Right 6/27/13    [facility]    OTHER SURGICAL HISTORY Right 6/27/13    Diagnostic Laparoscopy, Repair of RIH with medium light perfix plus system        Past Medical History:   Diagnosis Date    Anxiety disorder 7/30/2015    Depression 7/30/2015    H/O drug abuse ([facility] [address]     Rehab and detox 4/2013, pain pills       Social History     Socioeconomic History    Marital status:      Spouse name: Not on file    Number of children: Not on file    Years of education: Not on file    Highest education level: Not on file   Occupational History    Not on file   Social Needs    Financial resource strain: Not hard at all   [employer] insecurity     Worry: Not on file     Inability: Not on file   [employer] needs     Medical: Not on file     Non-medical: Not on file   Tobacco Use    Smoking status: Current Every Day Smoker     Packs/day: 1.50     Years: 30.00     Pack years: 45.00     Types: Cigarettes    Smokeless tobacco: Current User Substance and Sexual Activity    Alcohol use: Not on file    Drug use: Never    Sexual activity: Not on file   Lifestyle    Physical activity     Days per week: Not on file     Minutes per session: Not on file    Stress: Not on file   Relationships    Social connections     Talks on phone: Not on file     Gets together: Not on file     Attends Congregation service: Not on file     Active member of club or organization: Not on file     Attends meetings of clubs or organizations: Not on file     Relationship status: Not on file    Intimate partner violence     Fear of current or ex partner: Not on file     Emotionally abused: Not on file     Physically abused: Not on file     Forced sexual activity: Not on file   Other Topics Concern    Not on file   Social History Narrative    Not on file       No Known Allergies    Current Outpatient Medications on File Prior to Visit   Medication Sig Dispense Refill    hydroCHLOROthiazide (MICROZIDE) 12.5 MG capsule Take 1 capsule by mouth every morning 90 capsule 1    olopatadine (PATADAY) 0.2 % SOLN ophthalmic solution Instill 1 drop in each eye daily. 2.5 mL 1    folic acid (FOLVITE) 1 MG tablet Take 1 tablet by mouth daily 90 tablet 2    cloNIDine (CATAPRES) 0.1 MG tablet Take 0.1 mg by mouth nightly      pravastatin (PRAVACHOL) 20 MG tablet Take 1 tablet by mouth every evening 90 tablet 4    clotrimazole-betamethasone (LOTRISONE) 1-0.05 % cream Apply topically 2 times daily to feet. 45 g 1    ARIPiprazole (ABILIFY) 10 MG tablet       lamoTRIgine (LAMICTAL) 100 MG tablet       VYVANSE 70 MG capsule TAKE 1 CAPSULE BY MOUTH EVERY MORNING       No current facility-administered medications on file prior to visit. Review of Systems   Constitutional: Negative. Negative for activity change, appetite change, chills, fatigue and fever. HENT: Negative. Negative for congestion, sore throat and trouble swallowing. Eyes: Negative. Respiratory: Negative. Negative for cough, shortness of breath and wheezing. Cardiovascular: Positive for leg swelling (chronic daily mild lower leg edema. ). Negative for chest pain and palpitations. Gastrointestinal: Negative. Negative for abdominal distention, abdominal pain, constipation, diarrhea, nausea and vomiting. Endocrine: Negative. Genitourinary: Negative. Negative for difficulty urinating, dysuria, hematuria and urgency. Musculoskeletal: Negative. Negative for back pain, neck pain and neck stiffness. Skin: Negative. Neurological: Negative for dizziness, speech difficulty, light-headedness, numbness and headaches. Hematological: Negative. Psychiatric/Behavioral: Negative. OBJECTIVE:  /76   Pulse 89   Wt 164 lb (74.4 kg)   BMI 23.53 kg/m²     Physical Exam  Constitutional:       General: He is not in acute distress. Appearance: Normal appearance. He is not ill-appearing. HENT:      Head: Normocephalic and atraumatic. Nose: No congestion. Neck:      Musculoskeletal: Neck supple. Cardiovascular:      Rate and Rhythm: Normal rate. Pulses: Normal pulses. Dorsalis pedis pulses are 2+ on the right side and 2+ on the left side. Posterior tibial pulses are 2+ on the right side and 2+ on the left side. Heart sounds: Normal heart sounds. Comments: Scattered varicose veins throughout both lower legs asymptomatic. Pulmonary:      Breath sounds: Examination of the right-lower field reveals wheezing. Examination of the left-lower field reveals wheezing. Wheezing present. Abdominal:      Palpations: Abdomen is soft. Musculoskeletal:         General: No tenderness or signs of injury. Right lower leg: Edema (trace lower leg) present. Left lower leg: Edema (trace lower leg) present. Skin:     Capillary Refill: Capillary refill takes 2 to 3 seconds. Findings: No bruising, erythema, lesion or rash.    Neurological:      Mental Status: He

## 2021-05-26 ENCOUNTER — OFFICE VISIT (OUTPATIENT)
Dept: INTERVENTIONAL RADIOLOGY/VASCULAR | Age: 51
End: 2021-05-26
Payer: COMMERCIAL

## 2021-05-26 VITALS
DIASTOLIC BLOOD PRESSURE: 62 MMHG | WEIGHT: 164 LBS | HEART RATE: 89 BPM | OXYGEN SATURATION: 99 % | HEIGHT: 70 IN | SYSTOLIC BLOOD PRESSURE: 123 MMHG | BODY MASS INDEX: 23.48 KG/M2

## 2021-05-26 DIAGNOSIS — I83.93 ASYMPTOMATIC VARICOSE VEINS OF BILATERAL LOWER EXTREMITIES: ICD-10-CM

## 2021-05-26 DIAGNOSIS — R60.0 BILATERAL LEG EDEMA: Primary | ICD-10-CM

## 2021-05-26 PROCEDURE — 99213 OFFICE O/P EST LOW 20 MIN: CPT | Performed by: NURSE PRACTITIONER

## 2021-05-26 RX ORDER — BUPRENORPHINE AND NALOXONE 8; 2 MG/1; MG/1
FILM, SOLUBLE BUCCAL; SUBLINGUAL
COMMUNITY
Start: 2021-05-18

## 2021-05-26 ASSESSMENT — ENCOUNTER SYMPTOMS
BACK PAIN: 0
RESPIRATORY NEGATIVE: 1
SORE THROAT: 0
NAUSEA: 0
COUGH: 0
ABDOMINAL DISTENTION: 0
SHORTNESS OF BREATH: 0
WHEEZING: 0
TROUBLE SWALLOWING: 0
CONSTIPATION: 0
DIARRHEA: 0
VOMITING: 0
ABDOMINAL PAIN: 0
EYES NEGATIVE: 1
GASTROINTESTINAL NEGATIVE: 1

## 2021-05-26 NOTE — PROGRESS NOTES
Judit Hernandez, a male of 53 y.o. came to the office 5/26/2021. Chief Complaint   Patient presents with    Other        PROGRESS NOTE:     SUBJECTIVE:     5/26/2021: Judit Hernandez returns for results of LE venous studies to evaluate for insufficiency. Reports LE edema has decreased. Has decreased his sodium intake and elevates LE's when sitting/lying down. This is trace edema to both distal legs now. He is otherwise in good health. 5/12/2021 HPI: Judit Hernandez is a pleasant male who presents to clinic for consultation at the request of PCP for evaluation of LE edema. Patient presents with symptoms of: mild lower leg chronic daily edema. Onset x 2 months. Placed on diuretic with improvement. Today is minimal edema. States he eats high sodium diet. Denies any LE pain, aching, fatigue or heaviness sensation. There are scattered small varicose veins to both legs that are asymptomatic. Leg elevation: daily with relief. Stocking use and dates: Has never done conservative therapy with compression stockings.    Denies claudication      Family History   Problem Relation Age of Onset    Cancer Mother     Depression Mother     Cancer Father         Colon    High Cholesterol Father        Past Surgical History:   Procedure Laterality Date    CHOLECYSTECTOMY  6-7-13    lapchole with gram    HERNIA REPAIR Right 6/27/13    [facility]    OTHER SURGICAL HISTORY Right 6/27/13    Diagnostic Laparoscopy, Repair of RIH with medium light perfix plus system        Past Medical History:   Diagnosis Date    Anxiety disorder 7/30/2015    Depression 7/30/2015    H/O drug abuse ([address]     Rehab and detox 4/2013, pain pills       Social History     Socioeconomic History    Marital status:      Spouse name: None    Number of children: None    Years of education: None    Highest education level: None   Occupational History    None   Tobacco Use    Smoking status: Current Every Day Smoker     Packs/day: 1.50     Years: 30.00     Pack years: 45.00     Types: Cigarettes    Smokeless tobacco: Current User   Substance and Sexual Activity    Alcohol use: None    Drug use: Never    Sexual activity: None   Other Topics Concern    None   Social History Narrative    None     Social Determinants of Health     Financial Resource Strain: Low Risk     Difficulty of Paying Living Expenses: Not hard at all   Food Insecurity:     Worried About [address] in the Last Year:     [address] [employer] in the Last Year:    Transportation Needs:     Lack of Transportation (Medical):  Lack of Transportation (Non-Medical):    Physical Activity:     Days of Exercise per Week:     Minutes of Exercise per Session:    Stress:     Feeling of Stress :    Social Connections:     Frequency of Communication with Friends and Family:     Frequency of Social Gatherings with Friends and Family:     Attends Synagogue Services:     Active Member of Clubs or Organizations:     Attends Club or Organization Meetings:     Marital Status:    Intimate Partner Violence:     Fear of Current or Ex-Partner:     Emotionally Abused:     Physically Abused:     Sexually Abused:        No Known Allergies    Current Outpatient Medications on File Prior to Visit   Medication Sig Dispense Refill    buprenorphine-naloxone (SUBOXONE) 8-2 MG FILM SL film DISSOLVE 1 (ONE) film UNDER THE TONGUE EVERY DAY      hydroCHLOROthiazide (MICROZIDE) 12.5 MG capsule Take 1 capsule by mouth every morning 90 capsule 1    olopatadine (PATADAY) 0.2 % SOLN ophthalmic solution Instill 1 drop in each eye daily. 2.5 mL 1    folic acid (FOLVITE) 1 MG tablet Take 1 tablet by mouth daily 90 tablet 2    cloNIDine (CATAPRES) 0.1 MG tablet Take 0.1 mg by mouth nightly      pravastatin (PRAVACHOL) 20 MG tablet Take 1 tablet by mouth every evening 90 tablet 4    clotrimazole-betamethasone (LOTRISONE) 1-0.05 % cream Apply topically 2 times daily to feet.  45 g 1   

## 2021-06-09 LAB
ALT SERPL-CCNC: 14 U/L (ref 0–41)
AST SERPL-CCNC: 15 U/L (ref 0–40)

## 2021-06-29 DIAGNOSIS — F90.9 ADULT ADHD: Primary | ICD-10-CM

## 2021-06-29 RX ORDER — LAMOTRIGINE 100 MG/1
100 TABLET ORAL
Qty: 30 TABLET | OUTPATIENT
Start: 2021-06-29

## 2021-06-29 RX ORDER — ARIPIPRAZOLE 10 MG/1
10 TABLET ORAL DAILY
Qty: 30 TABLET | Refills: 5 | Status: SHIPPED | OUTPATIENT
Start: 2021-06-29 | End: 2022-01-25

## 2021-06-29 RX ORDER — CLONIDINE HYDROCHLORIDE 0.1 MG/1
0.1 TABLET ORAL NIGHTLY
Qty: 60 TABLET | Refills: 5 | Status: SHIPPED | OUTPATIENT
Start: 2021-06-29 | End: 2021-08-02

## 2021-06-29 RX ORDER — LISDEXAMFETAMINE DIMESYLATE 70 MG/1
CAPSULE ORAL
Qty: 30 CAPSULE | Refills: 0 | Status: SHIPPED | OUTPATIENT
Start: 2021-06-29 | End: 2021-08-26 | Stop reason: SDUPTHER

## 2021-06-29 NOTE — TELEPHONE ENCOUNTER
Patient requesting medication refill. Please approve or deny this request. He states his provider retired and pcp told him to call when he needed these medications. Please advise and thank you.      Rx requested:  Requested Prescriptions     Pending Prescriptions Disp Refills    cloNIDine (CATAPRES) 0.1 MG tablet 60 tablet      Sig: Take 1 tablet by mouth nightly    VYVANSE 70 MG capsule      ARIPiprazole (ABILIFY) 10 MG tablet 30 tablet     lamoTRIgine (LAMICTAL) 100 MG tablet 30 tablet          Last Office Visit:   4/29/2021      Next Visit Date:  Future Appointments   Date Time Provider Vanna Calvo   7/30/2021 10:15 AM Trev Miranda PA-C [address] [place] [address]

## 2021-07-01 RX ORDER — LAMOTRIGINE 100 MG/1
100 TABLET ORAL DAILY
Qty: 90 TABLET | Refills: 2 | Status: SHIPPED | OUTPATIENT
Start: 2021-07-01 | End: 2022-01-25

## 2021-07-01 NOTE — TELEPHONE ENCOUNTER
Patient requesting medication refill. Please approve or deny this request.    Patient states he is taking once daily.      Rx requested:  Requested Prescriptions     Pending Prescriptions Disp Refills    lamoTRIgine (LAMICTAL) 100 MG tablet 30 tablet          Last Office Visit:   4/29/2021      Next Visit Date:  Future Appointments   Date Time Provider Vanna Calvo   7/30/2021 10:15 AM DHARMESH Jeffery [facility]

## 2021-07-30 ENCOUNTER — OFFICE VISIT (OUTPATIENT)
Dept: FAMILY MEDICINE CLINIC | Age: 51
End: 2021-07-30
Payer: COMMERCIAL

## 2021-07-30 VITALS
RESPIRATION RATE: 18 BRPM | HEIGHT: 70 IN | WEIGHT: 156.2 LBS | OXYGEN SATURATION: 97 % | HEART RATE: 86 BPM | TEMPERATURE: 97.5 F | DIASTOLIC BLOOD PRESSURE: 68 MMHG | BODY MASS INDEX: 22.36 KG/M2 | SYSTOLIC BLOOD PRESSURE: 98 MMHG

## 2021-07-30 DIAGNOSIS — Z12.11 COLON CANCER SCREENING: ICD-10-CM

## 2021-07-30 DIAGNOSIS — E53.8 FOLATE DEFICIENCY: ICD-10-CM

## 2021-07-30 DIAGNOSIS — Z01.818 PRE-OP TESTING: Primary | ICD-10-CM

## 2021-07-30 DIAGNOSIS — Z11.59 NEED FOR HEPATITIS C SCREENING TEST: ICD-10-CM

## 2021-07-30 DIAGNOSIS — I83.93 ASYMPTOMATIC VARICOSE VEINS OF BILATERAL LOWER EXTREMITIES: ICD-10-CM

## 2021-07-30 DIAGNOSIS — Z11.4 ENCOUNTER FOR SCREENING FOR HIV: ICD-10-CM

## 2021-07-30 DIAGNOSIS — K59.09 CHRONIC CONSTIPATION: ICD-10-CM

## 2021-07-30 DIAGNOSIS — R60.0 LOWER EXTREMITY EDEMA: ICD-10-CM

## 2021-07-30 DIAGNOSIS — F39 MOOD DISORDER (HCC): Primary | ICD-10-CM

## 2021-07-30 LAB
FOLATE: >20 NG/ML (ref 7.3–26.1)
HEPATITIS C ANTIBODY INTERPRETATION: NORMAL
VITAMIN B-12: 1124 PG/ML (ref 232–1245)

## 2021-07-30 PROCEDURE — 99214 OFFICE O/P EST MOD 30 MIN: CPT | Performed by: PHYSICIAN ASSISTANT

## 2021-07-30 NOTE — PROGRESS NOTES
Subjective  Daily Monroe, 53 y.o. male presents today with:  Chief Complaint   Patient presents with    Edema     follow up on bilateral leg swelling. Pt states that this is getting better since last visit      HPI  Armando Minaya is in the office today for follow up visit   Last OV with me: 4/29/21. Due for colon ca screening, labs. LE edema. Patient was seen by IR on 5/12 and again on 5/26 for follow up. Had LE venous studies to evaluate for insufficiency. NO significant venous insufficiency. +varicose veins, bilateral.  Compression socks ordered for patient. Admits to not wearing daily/consistently. Was started on 12.5 mg HCTZ. Has also reduced sodium intake. Noted great improvement in swelling.  Trenda Marchi has improved with dietary sodium reduction and elevation of LEs. Folate def. Started on replacement therapy.    Due to have lab repeated today. Mood disorder. Stable on current medications. Denies worsening depressed mood/anxiety. Constipation. Inquiring after medication for constipation. Has tried OTC metamucil and miralax without much relief. Hard stool, straining every 2-3 days. Scant blood from straining/hemorrhoid. Due for colon ca screening. Review of Systems   Constitutional: Negative for activity change, appetite change, chills, diaphoresis, fatigue and fever. HENT: Negative for congestion, dental problem and trouble swallowing. Eyes: Negative for visual disturbance. Respiratory: Negative for cough, chest tightness, shortness of breath and wheezing. Cardiovascular: Positive for leg swelling (IMPROVED). Negative for chest pain and palpitations. Gastrointestinal: Negative for abdominal distention and abdominal pain. Genitourinary: Negative for difficulty urinating, dysuria, frequency, hematuria, penile pain, penile swelling, scrotal swelling, testicular pain and urgency. Skin: Negative for color change and pallor.    Neurological: Positive for numbness (feet). Negative for seizures, speech difficulty and weakness. Psychiatric/Behavioral: Negative for agitation, behavioral problems, confusion, dysphoric mood and sleep disturbance. The patient is not nervous/anxious. Past Medical History:   Diagnosis Date    Anxiety disorder 7/30/2015    Depression 7/30/2015    H/O drug abuse ([facility] [address]     Rehab and detox 4/2013, pain pills     Past Surgical History:   Procedure Laterality Date    CHOLECYSTECTOMY  6-7-13    lapchole with gram    HERNIA REPAIR Right 6/27/13    RIH    OTHER SURGICAL HISTORY Right 6/27/13    Diagnostic Laparoscopy, Repair of RIH with medium light perfix plus system     Social History     Socioeconomic History    Marital status:      Spouse name: Not on file    Number of children: Not on file    Years of education: Not on file    Highest education level: Not on file   Occupational History    Not on file   Tobacco Use    Smoking status: Current Every Day Smoker     Packs/day: 1.50     Years: 30.00     Pack years: 45.00     Types: Cigarettes    Smokeless tobacco: Current User   Substance and Sexual Activity    Alcohol use: Not on file    Drug use: Never    Sexual activity: Not on file   Other Topics Concern    Not on file   Social History Narrative    Not on file     Social Determinants of Health     Financial Resource Strain: Low Risk     Difficulty of Paying Living Expenses: Not hard at all   Food Insecurity:     Worried About [address] [employer] in the Last Year:     Ran Out of Food in the Last Year:    Transportation Needs:     Lack of Transportation (Medical):      Lack of Transportation (Non-Medical):    Physical Activity:     Days of Exercise per Week:     Minutes of Exercise per Session:    Stress:     Feeling of Stress :    Social Connections:     Frequency of Communication with Friends and Family:     Frequency of Social Gatherings with Friends and Family:     Attends Synagogue Services:     Active Member of Clubs or Organizations:     Attends Club or Organization Meetings:     Marital Status:    Intimate Partner Violence:     Fear of Current or Ex-Partner:     Emotionally Abused:     Physically Abused:     Sexually Abused:      Family History   Problem Relation Age of Onset    Cancer Mother     Depression Mother     Cancer Father         Colon    High Cholesterol Father      No Known Allergies  Current Outpatient Medications   Medication Sig Dispense Refill    linaclotide (LINZESS) 145 MCG capsule Take 1 capsule by mouth every morning (before breakfast) 30 capsule 2    lamoTRIgine (LAMICTAL) 100 MG tablet Take 1 tablet by mouth daily 90 tablet 2    cloNIDine (CATAPRES) 0.1 MG tablet Take 1 tablet by mouth nightly 60 tablet 5    VYVANSE 70 MG capsule TAKE 1 CAPSULE BY MOUTH EVERY MORNING 30 capsule 0    ARIPiprazole (ABILIFY) 10 MG tablet Take 1 tablet by mouth daily 30 tablet 5    buprenorphine-naloxone (SUBOXONE) 8-2 MG FILM SL film DISSOLVE 1 (ONE) film UNDER THE TONGUE EVERY DAY      Elastic Bandages & Supports (MEDICAL COMPRESSION STOCKINGS) MISC 1 each by Does not apply route daily Knee high 15-20 mmhg compression stockings both legs wear daily during day and off Qhs. Wear as tolerated. Do not wear if they cause increased pain or lesions. 1 each 5    hydroCHLOROthiazide (MICROZIDE) 12.5 MG capsule Take 1 capsule by mouth every morning 90 capsule 1    folic acid (FOLVITE) 1 MG tablet Take 1 tablet by mouth daily 90 tablet 2    pravastatin (PRAVACHOL) 20 MG tablet Take 1 tablet by mouth every evening 90 tablet 4    clotrimazole-betamethasone (LOTRISONE) 1-0.05 % cream Apply topically 2 times daily to feet. 45 g 1     No current facility-administered medications for this visit. PMH, Surgical Hx, Family Hx, and Social Hx reviewed and updated. Health Maintenance reviewed.     Objective  Vitals:    07/30/21 1020   BP: 98/68   Site: Left Upper Arm   Position: Sitting   Cuff Size: Medium Adult Future    Need for hepatitis C screening test  -     Hepatitis C Antibody; Future    Asymptomatic varicose veins of bilateral lower extremities    Lower extremity edema    Continue current medications. Continue low sodium diet, elevation of legs. Due for colonoscopy. Trial linzess, samples of 145 mcg given today. May also try warm prune juice and miralax. Follow up in 6 months. Orders Placed This Encounter   Procedures    Hepatitis C Antibody     Standing Status:   Future     Number of Occurrences:   1     Standing Expiration Date:   7/30/2022    HIV Screen     Standing Status:   Future     Number of Occurrences:   1     Standing Expiration Date:   7/30/2022    Vitamin B12 & Folate     Standing Status:   Future     Number of Occurrences:   1     Standing Expiration Date:   7/30/2022   Marty Vinson MD, Gastroenterology, [place]     Referral Priority:   Routine     Referral Type:   Eval and Treat     Referral Reason:   Specialty Services Required     Referred to Provider:   Sakina Daigle MD     Requested Specialty:   Gastroenterology     Number of Visits Requested:   1     Orders Placed This Encounter   Medications    linaclotide (LINZESS) 145 MCG capsule     Sig: Take 1 capsule by mouth every morning (before breakfast)     Dispense:  30 capsule     Refill:  2     Medications Discontinued During This Encounter   Medication Reason    olopatadine (PATADAY) 0.2 % SOLN ophthalmic solution LIST CLEANUP     No follow-ups on file. Reviewed with the patient: current clinical status, medications, activities and diet. Side effects, adverse effects of the medication prescribed today, as well as treatment plan/ rationale and result expectations have been discussed with the patient who expresses understanding and desires to proceed. Close follow up to evaluate treatment results and for coordination of care.   I have reviewed the patient's medical history in detail and updated the computerized patient record.     Pati Mustafa PA-C

## 2021-07-31 PROBLEM — K59.09 CHRONIC CONSTIPATION: Status: ACTIVE | Noted: 2021-07-31

## 2021-07-31 PROBLEM — H10.13 ALLERGIC CONJUNCTIVITIS OF BOTH EYES: Status: RESOLVED | Noted: 2021-04-29 | Resolved: 2021-07-31

## 2021-07-31 LAB — HIV AG/AB: NONREACTIVE

## 2021-07-31 ASSESSMENT — ENCOUNTER SYMPTOMS
ABDOMINAL PAIN: 0
WHEEZING: 0
TROUBLE SWALLOWING: 0
COUGH: 0
ABDOMINAL DISTENTION: 0
COLOR CHANGE: 0
SHORTNESS OF BREATH: 0
CHEST TIGHTNESS: 0

## 2021-08-25 DIAGNOSIS — F90.9 ADULT ADHD: ICD-10-CM

## 2021-08-26 RX ORDER — LISDEXAMFETAMINE DIMESYLATE 70 MG/1
CAPSULE ORAL
Qty: 30 CAPSULE | Refills: 0 | Status: SHIPPED | OUTPATIENT
Start: 2021-08-26 | End: 2021-09-28 | Stop reason: SDUPTHER

## 2021-09-03 ENCOUNTER — ANCILLARY PROCEDURE (OUTPATIENT)
Dept: ENDOSCOPY | Age: 51
End: 2021-09-03
Attending: INTERNAL MEDICINE
Payer: COMMERCIAL

## 2021-09-03 ENCOUNTER — HOSPITAL ENCOUNTER (OUTPATIENT)
Age: 51
Setting detail: OUTPATIENT SURGERY
Discharge: HOME | End: 2021-09-03
Attending: INTERNAL MEDICINE | Admitting: INTERNAL MEDICINE
Payer: COMMERCIAL

## 2021-09-03 ENCOUNTER — ANESTHESIA (OUTPATIENT)
Dept: ENDOSCOPY | Age: 51
End: 2021-09-03
Payer: COMMERCIAL

## 2021-09-03 ENCOUNTER — ANESTHESIA EVENT (OUTPATIENT)
Dept: ENDOSCOPY | Age: 51
End: 2021-09-03
Payer: COMMERCIAL

## 2021-09-03 VITALS — OXYGEN SATURATION: 99 % | DIASTOLIC BLOOD PRESSURE: 71 MMHG | SYSTOLIC BLOOD PRESSURE: 98 MMHG

## 2021-09-03 VITALS
WEIGHT: 170 LBS | OXYGEN SATURATION: 97 % | HEART RATE: 69 BPM | DIASTOLIC BLOOD PRESSURE: 87 MMHG | HEIGHT: 70 IN | SYSTOLIC BLOOD PRESSURE: 132 MMHG | BODY MASS INDEX: 24.34 KG/M2 | TEMPERATURE: 97.4 F | RESPIRATION RATE: 20 BRPM

## 2021-09-03 LAB — SARS-COV-2, NAAT: NOT DETECTED

## 2021-09-03 PROCEDURE — 2580000003 HC RX 258: Performed by: NURSE ANESTHETIST, CERTIFIED REGISTERED

## 2021-09-03 PROCEDURE — 3700000001 HC ADD 15 MINUTES (ANESTHESIA): Performed by: INTERNAL MEDICINE

## 2021-09-03 PROCEDURE — 7100000011 HC PHASE II RECOVERY - ADDTL 15 MIN: Performed by: INTERNAL MEDICINE

## 2021-09-03 PROCEDURE — 3700000000 HC ANESTHESIA ATTENDED CARE: Performed by: INTERNAL MEDICINE

## 2021-09-03 PROCEDURE — 2580000003 HC RX 258

## 2021-09-03 PROCEDURE — 2580000003 HC RX 258: Performed by: INTERNAL MEDICINE

## 2021-09-03 PROCEDURE — 6370000000 HC RX 637 (ALT 250 FOR IP): Performed by: INTERNAL MEDICINE

## 2021-09-03 PROCEDURE — 2709999900 HC NON-CHARGEABLE SUPPLY: Performed by: INTERNAL MEDICINE

## 2021-09-03 PROCEDURE — 7100000010 HC PHASE II RECOVERY - FIRST 15 MIN: Performed by: INTERNAL MEDICINE

## 2021-09-03 PROCEDURE — 87635 SARS-COV-2 COVID-19 AMP PRB: CPT

## 2021-09-03 PROCEDURE — 3609027000 HC COLONOSCOPY: Performed by: INTERNAL MEDICINE

## 2021-09-03 PROCEDURE — 45378 DIAGNOSTIC COLONOSCOPY: CPT | Performed by: INTERNAL MEDICINE

## 2021-09-03 PROCEDURE — 2500000003 HC RX 250 WO HCPCS: Performed by: NURSE ANESTHETIST, CERTIFIED REGISTERED

## 2021-09-03 RX ORDER — LIDOCAINE HYDROCHLORIDE 20 MG/ML
INJECTION, SOLUTION INFILTRATION; PERINEURAL PRN
Status: DISCONTINUED | OUTPATIENT
Start: 2021-09-03 | End: 2021-09-03 | Stop reason: SDUPTHER

## 2021-09-03 RX ORDER — SODIUM CHLORIDE 9 MG/ML
INJECTION, SOLUTION INTRAVENOUS CONTINUOUS PRN
Status: DISCONTINUED | OUTPATIENT
Start: 2021-09-03 | End: 2021-09-03 | Stop reason: SDUPTHER

## 2021-09-03 RX ORDER — PROPOFOL 10 MG/ML
INJECTION, EMULSION INTRAVENOUS CONTINUOUS PRN
Status: DISCONTINUED | OUTPATIENT
Start: 2021-09-03 | End: 2021-09-03 | Stop reason: SDUPTHER

## 2021-09-03 RX ORDER — SODIUM CHLORIDE 9 MG/ML
INJECTION, SOLUTION INTRAVENOUS
Status: COMPLETED
Start: 2021-09-03 | End: 2021-09-03

## 2021-09-03 RX ORDER — ONDANSETRON 2 MG/ML
4 INJECTION INTRAMUSCULAR; INTRAVENOUS
Status: DISCONTINUED | OUTPATIENT
Start: 2021-09-03 | End: 2021-09-03 | Stop reason: HOSPADM

## 2021-09-03 RX ORDER — MAGNESIUM HYDROXIDE 1200 MG/15ML
LIQUID ORAL PRN
Status: DISCONTINUED | OUTPATIENT
Start: 2021-09-03 | End: 2021-09-03 | Stop reason: ALTCHOICE

## 2021-09-03 RX ADMIN — PROPOFOL 140 MCG/KG/MIN: 10 INJECTION, EMULSION INTRAVENOUS at 09:23

## 2021-09-03 RX ADMIN — SODIUM CHLORIDE 500 ML: 9 INJECTION, SOLUTION INTRAVENOUS at 08:53

## 2021-09-03 RX ADMIN — SODIUM CHLORIDE: 9 INJECTION, SOLUTION INTRAVENOUS at 09:12

## 2021-09-03 RX ADMIN — LIDOCAINE HYDROCHLORIDE 40 MG: 20 INJECTION, SOLUTION INFILTRATION; PERINEURAL at 09:23

## 2021-09-03 ASSESSMENT — PULMONARY FUNCTION TESTS
PIF_VALUE: 0
PIF_VALUE: 1
PIF_VALUE: 0

## 2021-09-03 ASSESSMENT — PAIN - FUNCTIONAL ASSESSMENT: PAIN_FUNCTIONAL_ASSESSMENT: 0-10

## 2021-09-03 NOTE — ANESTHESIA POSTPROCEDURE EVALUATION
Department of Anesthesiology  Postprocedure Note    Patient: Claudio Rust  MRN: [id number]  YOB: 1970  Date of evaluation: 9/3/2021  Time:  9:52 AM     Procedure Summary     Date: 09/03/21 Room / Location: [address] [place] [address] Jesus Museenstein    Anesthesia Start: 9601 Anesthesia Stop:     Procedure: COLORECTAL CANCER SCREENING, HIGH RISK (N/A ) Diagnosis: (Family history of colon cancer Z80.0)    Surgeons: Wicho Espinosa MD Responsible Provider: SUBHA Fields CRNA    Anesthesia Type: MAC ASA Status: 2          Anesthesia Type: No value filed. Adam Phase I: Adam Score: 10    Adam Phase II:      Last vitals: Reviewed and per EMR flowsheets.        Anesthesia Post Evaluation    Patient location during evaluation: PACU  Patient participation: complete - patient participated  Level of consciousness: awake and alert  Airway patency: patent  Nausea & Vomiting: no nausea and no vomiting  Complications: no  Cardiovascular status: blood pressure returned to baseline and hemodynamically stable  Respiratory status: acceptable  Hydration status: euvolemic

## 2021-09-03 NOTE — H&P
Patient Name: Ann Marie Bernal  : 1970  MRN: [id number]  DATE: 21      ENDOSCOPY  History and Physical    Procedure:    [] Diagnostic Colonoscopy       [x] Screening Colonoscopy  [] EGD      [] ERCP      [] EUS       [] Other    [x] Previous office notes/History and Physical reviewed from the patients chart. Please see EMR for further details of HPI. I have examined the patient's status immediately prior to the procedure and:      Indications/HPI:    []Abdominal Pain   []Cancer- GI/Lung  [x]Fhx of colon CA  []History of Polyps   []Lopezs   []Melena  []Abnormal Imaging   []Dysphagia    []Persistent Pneumonia  []Anemia   []Food Impaction  []History of Polyps  []GI Bleed   []Pulmonary nodule/Mass  []Change in bowel habits  []Heartburn/Reflux  []Rectal Bleed (BRBPR)  []Chest Pain - Non Cardiac  []Heme (+) Stool  []Ulcers  []Constipation   []Hemoptysis   []Varices  []Diarrhea   []Hypoxemia  []Nausea/Vomiting   []Screening   []Crohns/Colitis  []Other:    Anesthesia:   [x] MAC [] Moderate Sedation   [] General   [] None     ROS: 12 pt Review of Symptoms was negative unless mentioned above    Medications:   Prior to Admission medications    Medication Sig Start Date End Date Taking?  Authorizing Provider   VYVANSE 70 MG capsule TAKE 1 CAPSULE BY MOUTH EVERY MORNING 21 Yes Pati Mustafa PA-C   linaclotide (LINZESS) 145 MCG capsule Take 1 capsule by mouth every morning (before breakfast) 21  Yes Pati Mustafa PA-C   buprenorphine-naloxone (SUBOXONE) 8-2 MG FILM SL film DISSOLVE 1 (ONE) film UNDER THE TONGUE EVERY DAY 21  Yes Historical Provider, MD   hydroCHLOROthiazide (MICROZIDE) 12.5 MG capsule Take 1 capsule by mouth every morning 21  Yes Pati Mustafa PA-C   folic acid (FOLVITE) 1 MG tablet Take 1 tablet by mouth daily 21  Yes Pati Mustafa PA-C   pravastatin (PRAVACHOL) 20 MG tablet Take 1 tablet by mouth every evening 4/15/21  Yes Pati Mustafa PA-C clotrimazole-betamethasone (LOTRISONE) 1-0.05 % cream APPLY TOPICALLY 2 TIMES DAILY TO FEET. 8/11/21   Pati Mustafa PA-C   cloNIDine (CATAPRES) 0.1 MG tablet TAKE 1 TABLET BY MOUTH EVERY DAY AT NIGHT 8/2/21   Pati Mustafa PA-C   lamoTRIgine (LAMICTAL) 100 MG tablet Take 1 tablet by mouth daily 7/1/21   Pati Mustafa PA-C   ARIPiprazole (ABILIFY) 10 MG tablet Take 1 tablet by mouth daily 6/29/21   Pati Mustafa PA-C   Elastic Bandages & Supports (MEDICAL COMPRESSION STOCKINGS) MISC 1 each by Does not apply route daily Knee high 15-20 mmhg compression stockings both legs wear daily during day and off Qhs. Wear as tolerated. Do not wear if they cause increased pain or lesions.  5/26/21   Camron Marcus, SUBHA - CNP       Allergies: No Known Allergies     History of allergic reaction to anesthesia:  No    Past Medical History:  Past Medical History:   Diagnosis Date    Anxiety disorder 7/30/2015    Depression 7/30/2015    H/O drug abuse ([facility] [address]     Rehab and detox 4/2013, pain pills       Past Surgical History:  Past Surgical History:   Procedure Laterality Date    CHOLECYSTECTOMY  6-7-13    lapchole with gram    HEMORRHOID SURGERY      HERNIA REPAIR Right 6/27/13    RIH    OTHER SURGICAL HISTORY Right 6/27/13    Diagnostic Laparoscopy, Repair of RIH with medium light perfix plus system       Social History:  Social History     Tobacco Use    Smoking status: Current Every Day Smoker     Packs/day: 1.50     Years: 30.00     Pack years: 45.00     Types: Cigarettes    Smokeless tobacco: Current User   Vaping Use    Vaping Use: Never used   Substance Use Topics    Alcohol use: Not Currently    Drug use: Yes     Types: Marijuana     Comment: occasional       Vital Signs:   Vitals:    09/03/21 0849   BP: 139/74   Pulse: 88   Resp: 16   Temp: 97.4 °F (36.3 °C)   SpO2: 97%        Physical Exam:  Cardiac:  [x]WNL []Comments:  Pulmonary:  [x]WNL   []Comments:   Neuro/Mental Status:  [x]WNL []Comments:  Abdominal:  [x]WNL    []Comments:  Other:   []WNL  []Comments:    Informed Consent:  The risks and benefits of the procedure have been discussed with either the patient or if they cannot consent, their representative. Assessment:  Patient examined and appropriate for planned sedation and procedure. Plan:  Proceed with planned sedation and procedure as above. The patient was counseled at length about risks of marlin COVID-19 in the perioperative and any recovery window from the procedure. The patient was made aware that marlin COVID-19 may worsen their prognosis for recovery from their procedure and lend to a higher morbidity and-all mortality risk. The patient was given the option of postponing the procedure all material risks, benefits, and alternatives were discussed. The patient does wish to proceed with the procedure at this time.     Pat Mckay MD  9:24 AM

## 2021-09-03 NOTE — ANESTHESIA PRE PROCEDURE
Department of Anesthesiology  Preprocedure Note       Name:  Agusto Vora   Age:  53 y.o.  :  1970                                          MRN:  [id number]         Date:  9/3/2021      Surgeon: Virgen Chen):  Stephanie Vences MD    Procedure: Procedure(s):  COLORECTAL CANCER SCREENING, HIGH RISK    Medications prior to admission:   Prior to Admission medications    Medication Sig Start Date End Date Taking? Authorizing Provider   VYVANSE 70 MG capsule TAKE 1 CAPSULE BY MOUTH EVERY MORNING 21 Yes Pati Mustafa PA-C   linaclotide (LINZESS) 145 MCG capsule Take 1 capsule by mouth every morning (before breakfast) 21  Yes Pati Mustafa PA-C   buprenorphine-naloxone (SUBOXONE) 8-2 MG FILM SL film DISSOLVE 1 (ONE) film UNDER THE TONGUE EVERY DAY 21  Yes Historical Provider, MD   hydroCHLOROthiazide (MICROZIDE) 12.5 MG capsule Take 1 capsule by mouth every morning 21  Yes Pati Mustafa PA-C   folic acid (FOLVITE) 1 MG tablet Take 1 tablet by mouth daily 21  Yes Pati Mustafa PA-C   pravastatin (PRAVACHOL) 20 MG tablet Take 1 tablet by mouth every evening 4/15/21  Yes Pati Mustafa PA-C   clotrimazole-betamethasone (LOTRISONE) 1-0.05 % cream APPLY TOPICALLY 2 TIMES DAILY TO FEET. 21   Pati Mustafa PA-C   cloNIDine (CATAPRES) 0.1 MG tablet TAKE 1 TABLET BY MOUTH EVERY DAY AT NIGHT 21   Pati Mustafa PA-C   lamoTRIgine (LAMICTAL) 100 MG tablet Take 1 tablet by mouth daily 21   Pati Mustafa PA-C   ARIPiprazole (ABILIFY) 10 MG tablet Take 1 tablet by mouth daily 21   Pati Mustafa PA-C   Elastic Bandages & Supports (MEDICAL COMPRESSION STOCKINGS) MISC 1 each by Does not apply route daily Knee high 15-20 mmhg compression stockings both legs wear daily during day and off Qhs. Wear as tolerated. Do not wear if they cause increased pain or lesions.  21   Camron Marcus, APRN - CNP       Current medications:    No current facility-administered medications for this encounter.        Allergies:  No Known Allergies    Problem List:    Patient Active Problem List   Diagnosis Code    Mood disorder ([facility] F39    Tobacco abuse Z72.0    Adult ADHD F90.9    Mixed hyperlipidemia E78.2    Substance abuse ([facility] F19.10    Lower extremity edema R60.0    Tinea pedis of both feet B35.3    Onychomycosis of toenail B35.1    Numbness and tingling of both feet R20.0, R20.2    BMI 23.0-23.9, adult Z68.23    Folate deficiency E53.8    Asymptomatic varicose veins of bilateral lower extremities I83.93    Chronic constipation K59.09       Past Medical History:        Diagnosis Date    Anxiety disorder 7/30/2015    Depression 7/30/2015    H/O drug abuse ([facility]     Rehab and detox 4/2013, pain pills       Past Surgical History:        Procedure Laterality Date    CHOLECYSTECTOMY  6-7-13    lapchole with gram    HEMORRHOID SURGERY      HERNIA REPAIR Right 6/27/13    RIH    OTHER SURGICAL HISTORY Right 6/27/13    Diagnostic Laparoscopy, Repair of RIH with medium light perfix plus system       Social History:    Social History     Tobacco Use    Smoking status: Current Every Day Smoker     Packs/day: 1.50     Years: 30.00     Pack years: 45.00     Types: Cigarettes    Smokeless tobacco: Current User   Substance Use Topics    Alcohol use: Not Currently                                Ready to quit: Not Answered  Counseling given: Not Answered      Vital Signs (Current):   Vitals:    09/03/21 0849   BP: 139/74   Pulse: 88   Resp: 16   Temp: 36.3 °C (97.4 °F)   TempSrc: Temporal   SpO2: 97%   Weight: 170 lb (77.1 kg)   Height: 5' 10\" (1.778 m)                                              BP Readings from Last 3 Encounters:   09/03/21 139/74   07/30/21 98/68   05/26/21 123/62       NPO Status: Time of last liquid consumption: 0200                        Time of last solid consumption: 2000                        Date of last liquid consumption: 09/03/21                        Date of last solid food consumption: 09/01/21    BMI:   Wt Readings from Last 3 Encounters:   09/03/21 170 lb (77.1 kg)   07/30/21 156 lb 3.2 oz (70.9 kg)   05/26/21 164 lb (74.4 kg)     Body mass index is 24.39 kg/m². CBC:   Lab Results   Component Value Date    WBC 10.8 04/15/2021    RBC 5.11 04/15/2021    RBC 5.80 04/06/2012    HGB 15.6 04/15/2021    HCT 45.6 04/15/2021    MCV 89.2 04/15/2021    RDW 13.5 04/15/2021     04/15/2021       CMP:   Lab Results   Component Value Date     04/15/2021    K 3.9 04/15/2021     04/15/2021    CO2 25 04/15/2021    BUN 5 04/15/2021    CREATININE 0.60 04/15/2021    GFRAA >60.0 04/15/2021    LABGLOM >60.0 04/15/2021    GLUCOSE 103 04/15/2021    GLUCOSE 140 04/06/2012    PROT 7.0 04/15/2021    CALCIUM 9.5 04/15/2021    BILITOT <0.2 04/15/2021    ALKPHOS 106 04/15/2021    AST 15 06/09/2021    ALT 14 06/09/2021       POC Tests: No results for input(s): POCGLU, POCNA, POCK, POCCL, POCBUN, POCHEMO, POCHCT in the last 72 hours.     Coags: No results found for: PROTIME, INR, APTT    HCG (If Applicable): No results found for: PREGTESTUR, PREGSERUM, HCG, HCGQUANT     ABGs: No results found for: PHART, PO2ART, KBJ6RYT, IIU2DNQ, BEART, K0UQUFHY     Type & Screen (If Applicable):  No results found for: LABABO, LABRH    Drug/Infectious Status (If Applicable):  No results found for: HIV, HEPCAB    COVID-19 Screening (If Applicable):   Lab Results   Component Value Date    COVID19 Not Detected 09/03/2021           Anesthesia Evaluation  Patient summary reviewed and Nursing notes reviewed  Airway: Mallampati: II  TM distance: >3 FB   Neck ROM: full  Mouth opening: > = 3 FB Dental: normal exam         Pulmonary:Negative Pulmonary ROS and normal exam                               Cardiovascular:Negative CV ROS                      Neuro/Psych:   (+) psychiatric history:            GI/Hepatic/Renal: Neg GI/Hepatic/Renal ROS            Endo/Other: Negative Endo/Other ROS Abdominal:             Vascular: Other Findings:             Anesthesia Plan      MAC     ASA 2             Anesthetic plan and risks discussed with patient. Plan discussed with CRNA.                   SUBHA Saleem - CRNA   9/3/2021

## 2021-09-28 ENCOUNTER — TELEPHONE (OUTPATIENT)
Dept: FAMILY MEDICINE CLINIC | Age: 51
End: 2021-09-28

## 2021-09-28 DIAGNOSIS — F90.9 ADULT ADHD: ICD-10-CM

## 2021-09-28 RX ORDER — LISDEXAMFETAMINE DIMESYLATE 70 MG/1
CAPSULE ORAL
Qty: 30 CAPSULE | Refills: 0 | Status: SHIPPED | OUTPATIENT
Start: 2021-09-28 | End: 2021-10-29 | Stop reason: SDUPTHER

## 2021-09-28 NOTE — TELEPHONE ENCOUNTER
----- Message from Rosa Hopson sent at 9/28/2021  9:01 AM EDT -----  Subject: Message to Provider    QUESTIONS  Information for Provider? Patient would like to talk to Dr. Perla Raphael about   stopping some of his medications. Would he need to schedule an appointment   for this? Please advise.  ---------------------------------------------------------------------------  --------------  CALL BACK INFO  What is the best way for the office to contact you? OK to leave message on   voicemail  Preferred Call Back Phone Number? [phone]  ---------------------------------------------------------------------------  --------------  SCRIPT ANSWERS  Relationship to Patient?  Self

## 2021-09-28 NOTE — TELEPHONE ENCOUNTER
Rx request   Requested Prescriptions     Pending Prescriptions Disp Refills    VYVANSE 70 MG capsule 30 capsule 0     Sig: TAKE 1 CAPSULE BY MOUTH EVERY MORNING     LOV Visit date not found  Next Visit Date:  Future Appointments   Date Time Provider Vanna Calvo   9/30/2021  2:45 PM MD Shravan Post [facility]   1/31/2022 10:15 AM DHARMESH Yepez [facility]

## 2021-10-29 DIAGNOSIS — F90.9 ADULT ADHD: ICD-10-CM

## 2021-10-29 RX ORDER — LISDEXAMFETAMINE DIMESYLATE 70 MG/1
CAPSULE ORAL
Qty: 30 CAPSULE | Refills: 0 | Status: SHIPPED | OUTPATIENT
Start: 2021-10-29 | End: 2021-11-29 | Stop reason: SDUPTHER

## 2021-10-29 NOTE — TELEPHONE ENCOUNTER
Patient requesting medication refill.  Please approve or deny this request.    Rx requested:  Requested Prescriptions     Pending Prescriptions Disp Refills    VYVANSE 70 MG capsule 30 capsule 0     Sig: TAKE 1 CAPSULE BY MOUTH EVERY MORNING         Last Office Visit:   Visit date not found      Next Visit Date:  Future Appointments   Date Time Provider Vanna Calvo   1/31/2022 10:15 AM Sagrario Gonzalez PA-C [address] [place] [address]

## 2021-11-01 ENCOUNTER — TELEPHONE (OUTPATIENT)
Dept: FAMILY MEDICINE CLINIC | Age: 51
End: 2021-11-01

## 2021-11-01 NOTE — TELEPHONE ENCOUNTER
----- Message from Isadora English sent at 10/29/2021  5:01 PM EDT -----  Subject: Message to Provider    QUESTIONS  Information for Provider? Pt would like know why the VYVANSE 70 MG capsule   is always late being called into the Pharmacy. Pt states he does not have   a problem with any other medication. Would like an explanation.  ---------------------------------------------------------------------------  --------------  CALL BACK INFO  What is the best way for the office to contact you? OK to leave message on   voicemail  Preferred Call Back Phone Number? [phone]  ---------------------------------------------------------------------------  --------------  SCRIPT ANSWERS  Relationship to Patient?  Self

## 2021-11-29 DIAGNOSIS — F90.9 ADULT ADHD: ICD-10-CM

## 2021-11-29 RX ORDER — LISDEXAMFETAMINE DIMESYLATE 70 MG/1
CAPSULE ORAL
Qty: 30 CAPSULE | Refills: 0 | Status: SHIPPED | OUTPATIENT
Start: 2021-11-29 | End: 2021-12-21 | Stop reason: SDUPTHER

## 2021-12-09 ENCOUNTER — TELEPHONE (OUTPATIENT)
Dept: FAMILY MEDICINE CLINIC | Age: 51
End: 2021-12-09

## 2021-12-09 NOTE — TELEPHONE ENCOUNTER
Patients wife called in and states he has an abscess in his mouth due to a broken tooth. He has an appt an appt .scheduled with the dentist on Monday but since he is not a patient of theirs they cannot prescribe him an abx. Patient is wondering if you are able to send him something over to his pharmacy.     Please advise and thank you

## 2021-12-10 DIAGNOSIS — K04.7 DENTAL INFECTION: Primary | ICD-10-CM

## 2021-12-10 RX ORDER — AMOXICILLIN AND CLAVULANATE POTASSIUM 875; 125 MG/1; MG/1
1 TABLET, FILM COATED ORAL 2 TIMES DAILY
Qty: 14 TABLET | Refills: 0 | Status: SHIPPED | OUTPATIENT
Start: 2021-12-10 | End: 2021-12-17

## 2021-12-14 ENCOUNTER — TELEPHONE (OUTPATIENT)
Dept: FAMILY MEDICINE CLINIC | Age: 51
End: 2021-12-14

## 2021-12-14 NOTE — TELEPHONE ENCOUNTER
----- Message from Brayden Chen sent at 12/14/2021 10:10 AM EST -----  Subject: Appointment Request    Reason for Call: Routine (Patient Request) No Script    QUESTIONS  Type of Appointment? Established Patient  Reason for appointment request? Available appointments did not meet   patient need  Additional Information for Provider? pt is having some tongue pain, hurts   when eats and drinks. was not showing any appts until January and pt would   like to be seen sooner. please reach out to pt to schedule.  ---------------------------------------------------------------------------  --------------  CALL BACK INFO  What is the best way for the office to contact you? OK to leave message on   voicemail  Preferred Call Back Phone Number? [phone]  ---------------------------------------------------------------------------  --------------  SCRIPT ANSWERS  Relationship to Patient? Self  (Is the patient requesting to see the provider for a procedure?)? No  (Is the patient requesting to see the provider urgently  today or   tomorrow. )? No  Have you been diagnosed with, awaiting test results for, or told that you   are suspected of having COVID-19 (Coronavirus)? (If patient has tested   negative or was tested as a requirement for work, school, or travel and   not based on symptoms, answer no)? No  Within the past two weeks have you developed any of the following symptoms   (answer no if symptoms have been present longer than 2 weeks or began   more than 2 weeks ago)? Fever or Chills, Cough, Shortness of breath or   difficulty breathing, Loss of taste or smell, Sore throat, Nasal   congestion, Sneezing or runny nose, Fatigue or generalized body aches   (answer no if pain is specific to a body part e.g. back pain), Diarrhea,   Headache? No  Have you had close contact with someone with COVID-19 in the last 14 days? No  (Service Expert  click yes below to proceed with [employer] As Usual   Scheduling)?  Yes

## 2021-12-16 ENCOUNTER — OFFICE VISIT (OUTPATIENT)
Dept: FAMILY MEDICINE CLINIC | Age: 51
End: 2021-12-16
Payer: COMMERCIAL

## 2021-12-16 VITALS
WEIGHT: 170 LBS | DIASTOLIC BLOOD PRESSURE: 82 MMHG | BODY MASS INDEX: 24.34 KG/M2 | HEIGHT: 70 IN | SYSTOLIC BLOOD PRESSURE: 110 MMHG | OXYGEN SATURATION: 98 % | HEART RATE: 101 BPM | TEMPERATURE: 96 F | RESPIRATION RATE: 16 BRPM

## 2021-12-16 DIAGNOSIS — G62.9 NEUROPATHY: ICD-10-CM

## 2021-12-16 DIAGNOSIS — F90.9 ADULT ADHD: ICD-10-CM

## 2021-12-16 DIAGNOSIS — K14.0 GLOSSITIS: Primary | ICD-10-CM

## 2021-12-16 DIAGNOSIS — K14.0 GLOSSITIS: ICD-10-CM

## 2021-12-16 DIAGNOSIS — K14.6 GLOSSODYNIA: ICD-10-CM

## 2021-12-16 LAB
BASOPHILS ABSOLUTE: 0.1 K/UL (ref 0–0.2)
BASOPHILS RELATIVE PERCENT: 0.6 %
EOSINOPHILS ABSOLUTE: 0 K/UL (ref 0–0.7)
EOSINOPHILS RELATIVE PERCENT: 0.5 %
HCT VFR BLD CALC: 39.9 % (ref 42–52)
HEMOGLOBIN: 13 G/DL (ref 14–18)
LYMPHOCYTES ABSOLUTE: 2.8 K/UL (ref 1–4.8)
LYMPHOCYTES RELATIVE PERCENT: 31 %
MCH RBC QN AUTO: 28.4 PG (ref 27–31.3)
MCHC RBC AUTO-ENTMCNC: 32.6 % (ref 33–37)
MCV RBC AUTO: 87.2 FL (ref 80–100)
MONOCYTES ABSOLUTE: 0.4 K/UL (ref 0.2–0.8)
MONOCYTES RELATIVE PERCENT: 4.7 %
NEUTROPHILS ABSOLUTE: 5.8 K/UL (ref 1.4–6.5)
NEUTROPHILS RELATIVE PERCENT: 63.2 %
PDW BLD-RTO: 14.1 % (ref 11.5–14.5)
PLATELET # BLD: 174 K/UL (ref 130–400)
RBC # BLD: 4.58 M/UL (ref 4.7–6.1)
REASON FOR REJECTION: NORMAL
REJECTED TEST: NORMAL
WBC # BLD: 9.2 K/UL (ref 4.8–10.8)

## 2021-12-16 PROCEDURE — 99213 OFFICE O/P EST LOW 20 MIN: CPT | Performed by: INTERNAL MEDICINE

## 2021-12-16 ASSESSMENT — ENCOUNTER SYMPTOMS
NAUSEA: 0
VOMITING: 0
COUGH: 0
WHEEZING: 0
CHEST TIGHTNESS: 0
SHORTNESS OF BREATH: 0

## 2021-12-16 NOTE — PROGRESS NOTES
Subjective:      Patient ID: Roxi Red is a 49 y.o. male who presents today for:  Chief Complaint   Patient presents with    Other     x3 weeks pain in tongue when eating anddrinking had thrush x1 year ago       HPI   Patient being seen for tongue pain. Reports progressive tongue pain and soreness with onset over 3 weeks. Eating and drinking causes significant worsening of pain. Denies recent changes to diet, medications or oral care products. Patient states he noticed whitish spots on tongue at the onset and has previously been diagnosed with oral thrush. The lesions have since resolved. Notably, patient also reports intermittent tingling and numbness in extremities (fingers and toes) which has progressively been getting worse. Concerned about this also. Past Medical History:   Diagnosis Date    Anxiety disorder 7/30/2015    Depression 7/30/2015    H/O drug abuse ([address]     Rehab and detox 4/2013, pain pills     Past Surgical History:   Procedure Laterality Date    CHOLECYSTECTOMY  6-7-13    lapchole with gram    COLONOSCOPY N/A 9/3/2021    COLORECTAL CANCER SCREENING, HIGH RISK performed by Glen Gaspar [facility] at [facility] Right 6/27/13    RIH    OTHER SURGICAL HISTORY Right 6/27/13    Diagnostic Laparoscopy, Repair of RIH with medium light perfix plus system     Family History   Problem Relation Age of Onset    Cancer Mother     Depression Mother     Cancer Father         Colon    High Cholesterol Father     Colon Cancer Father      No Known Allergies  Current Outpatient Medications on File Prior to Visit   Medication Sig Dispense Refill    VYVANSE 70 MG capsule TAKE 1 CAPSULE BY MOUTH EVERY MORNING 30 capsule 0    hydroCHLOROthiazide (MICROZIDE) 12.5 MG capsule TAKE 1 CAPSULE BY MOUTH EVERY DAY IN THE MORNING 30 capsule 5    clotrimazole-betamethasone (LOTRISONE) 1-0.05 % cream APPLY TOPICALLY 2 TIMES DAILY TO FEET.  45 g 1    buprenorphine-naloxone (SUBOXONE) 8-2 MG FILM SL film DISSOLVE 1 (ONE) film UNDER THE TONGUE EVERY DAY      Elastic Bandages & Supports (MEDICAL COMPRESSION STOCKINGS) MISC 1 each by Does not apply route daily Knee high 15-20 mmhg compression stockings both legs wear daily during day and off Qhs. Wear as tolerated. Do not wear if they cause increased pain or lesions. 1 each 5    folic acid (FOLVITE) 1 MG tablet Take 1 tablet by mouth daily 90 tablet 2    amoxicillin-clavulanate (AUGMENTIN) 875-125 MG per tablet Take 1 tablet by mouth 2 times daily for 7 days (Patient not taking: Reported on 12/16/2021) 14 tablet 0    cloNIDine (CATAPRES) 0.1 MG tablet TAKE 1 TABLET BY MOUTH EVERY DAY AT NIGHT (Patient not taking: Reported on 12/16/2021) 30 tablet 11    linaclotide (LINZESS) 145 MCG capsule Take 1 capsule by mouth every morning (before breakfast) (Patient not taking: Reported on 12/16/2021) 30 capsule 2    lamoTRIgine (LAMICTAL) 100 MG tablet Take 1 tablet by mouth daily (Patient not taking: Reported on 12/16/2021) 90 tablet 2    ARIPiprazole (ABILIFY) 10 MG tablet Take 1 tablet by mouth daily (Patient not taking: Reported on 12/16/2021) 30 tablet 5    pravastatin (PRAVACHOL) 20 MG tablet Take 1 tablet by mouth every evening (Patient not taking: Reported on 12/16/2021) 90 tablet 4     No current facility-administered medications on file prior to visit. Review of Systems   Constitutional: Negative for activity change, chills, diaphoresis, fatigue and fever. HENT: Negative. Respiratory: Negative for cough, chest tightness, shortness of breath and wheezing. Cardiovascular: Negative for chest pain, palpitations and leg swelling. Gastrointestinal: Negative for nausea and vomiting. Neurological: Negative for dizziness, syncope, light-headedness and headaches.        Objective:   /82 (Site: Right Upper Arm, Position: Sitting, Cuff Size: Medium Adult)   Pulse 101   Temp 96 °F (35.6 °C) (Temporal)   Resp 16   Ht 5' 10\" (1.778 m)   Wt 170 lb (77.1 kg)   SpO2 98%   BMI 24.39 kg/m²     Physical Exam  Constitutional:       General: He is not in acute distress. Appearance: Normal appearance. He is normal weight. He is not diaphoretic. HENT:      Head: Normocephalic and atraumatic. Mouth/Throat:      Comments: Angular Cheilitis. Tongue redness with fissuring overlying surface of tongue. Fissures also noted along lateral aspect of tongue borders. No whitish plaques appreciated. Cardiovascular:      Rate and Rhythm: Normal rate and regular rhythm. Pulses: Normal pulses. Heart sounds: Normal heart sounds. No murmur heard. No friction rub. Pulmonary:      Effort: Pulmonary effort is normal. No respiratory distress. Breath sounds: Normal breath sounds. No wheezing or rhonchi. Chest:      Chest wall: No tenderness. Abdominal:      General: Abdomen is flat. Bowel sounds are normal. There is no distension. Palpations: Abdomen is soft. Tenderness: There is no abdominal tenderness. Musculoskeletal:         General: No tenderness. Normal range of motion. Right lower leg: No edema. Left lower leg: No edema. Neurological:      Mental Status: He is alert. Assessment:      Jesse Archer was seen today for tongue pain. Diagnoses and all orders for this visit:    Glossitis with Glossodynia  - Progressive, burning tongue pain with soreness. - Allergic reaction unlikely as no new foods, medications or oral care products introduced recently. - Angular chelitis and coexisting neuropathy of the extremities raises concern for Vit B2 deficiency. B1 deficiency also a consideration.  - Presence of whitish plaques at onset with positive history may be d/t oral candidiasis; can occur in absence of plaques. Plan:  -     Vitamin B1; Future  -     Vitamin B2; Future  -     CBC With Auto Differential; Future  -     Vitamin B12 & Folate;  Future  -     Vitamin B6; Future  -     Start on B complex supplementation pending labs  -     Nystatin swish and swallow- 5 mls to be taken q6 x 7 days. ADHD  - Patient stable; doing well on Vyvanse. Health Maintenance   Topic Date Due    COVID-19 Vaccine (1) Never done    DTaP/Tdap/Td vaccine (1 - Tdap) Never done    Flu vaccine (1) Never done    Shingles Vaccine (1 of 2) Never done    Low dose CT lung screening  Never done    Pneumococcal 0-64 years Vaccine (1 of 2 - PPSV23) 07/30/2022 (Originally 10/19/1976)    Lipid screen  04/15/2022    Potassium monitoring  04/15/2022    Creatinine monitoring  04/15/2022    Colon cancer screen colonoscopy  09/03/2031    Hepatitis C screen  Completed    HIV screen  Completed    Hepatitis A vaccine  Aged Out    Hepatitis B vaccine  Aged Out    Hib vaccine  Aged Out    Meningococcal (ACWY) vaccine  [employer]:    As above. Orders Placed This Encounter   Procedures    Vitamin B1     Standing Status:   Future     Standing Expiration Date:   12/16/2022    Vitamin B2     Standing Status:   Future     Standing Expiration Date:   12/16/2022    CBC With Auto Differential     Standing Status:   Future     Standing Expiration Date:   12/16/2022    Vitamin B12 & Folate     Standing Status:   Future     Standing Expiration Date:   12/16/2022    Vitamin B6     Standing Status:   Future     Standing Expiration Date:   12/16/2022     No orders of the defined types were placed in this encounter. Return for F/u in 2 weeks.     Riki Schneider MD

## 2021-12-17 LAB
FOLATE: >20 NG/ML
VITAMIN B-12: 601 PG/ML (ref 232–1245)

## 2021-12-21 DIAGNOSIS — F90.9 ADULT ADHD: ICD-10-CM

## 2021-12-21 RX ORDER — LISDEXAMFETAMINE DIMESYLATE 70 MG/1
CAPSULE ORAL
Qty: 30 CAPSULE | Refills: 0 | Status: SHIPPED | OUTPATIENT
Start: 2021-12-21 | End: 2022-01-24 | Stop reason: SDUPTHER

## 2021-12-21 NOTE — TELEPHONE ENCOUNTER
Rx request   Requested Prescriptions     Pending Prescriptions Disp Refills    VYVANSE 70 MG capsule 30 capsule 0     Sig: TAKE 1 CAPSULE BY MOUTH EVERY MORNING     LOV 7/30/2021  Next Visit Date:  Future Appointments   Date Time Provider Vanna Calvo   12/30/2021  4:15 PM MD Debbie Meyer [facility]   1/31/2022 10:15 AM DHARMESH Cota [facility]

## 2021-12-22 LAB — VITAMIN B1, PLASMA: 2 NMOL/L (ref 4–15)

## 2022-01-10 DIAGNOSIS — K14.0 GLOSSITIS: ICD-10-CM

## 2022-01-10 DIAGNOSIS — E51.9 THIAMINE DEFICIENCY: Primary | ICD-10-CM

## 2022-01-10 DIAGNOSIS — G62.9 NEUROPATHY: ICD-10-CM

## 2022-01-13 DIAGNOSIS — K14.0 GLOSSITIS: ICD-10-CM

## 2022-01-13 DIAGNOSIS — G62.9 NEUROPATHY: ICD-10-CM

## 2022-01-18 DIAGNOSIS — E53.8 FOLATE DEFICIENCY: ICD-10-CM

## 2022-01-19 LAB — VITAMIN B6: 114 NMOL/L (ref 20–125)

## 2022-01-20 LAB — VITAMIN B2: 44 NMOL/L (ref 5–50)

## 2022-01-20 NOTE — TELEPHONE ENCOUNTER
Pharmacy requesting medication refill.  Please approve or deny this request.    Rx requested:  Requested Prescriptions     Pending Prescriptions Disp Refills    folic acid (FOLVITE) 1 MG tablet [Pharmacy Med Name: FOLIC ACID 1 MG TABLET] 30 tablet 8     Sig: TAKE 1 TABLET BY MOUTH EVERY DAY         Last Office Visit:   7/30/2021      Next Visit Date:  Future Appointments   Date Time Provider Vanna Calvo   1/31/2022 10:15 AM Sharyle Britain, PA-C [address] [place] [address]

## 2022-01-21 RX ORDER — FOLIC ACID 1 MG/1
TABLET ORAL
Qty: 30 TABLET | Refills: 8 | Status: SHIPPED | OUTPATIENT
Start: 2022-01-21

## 2022-01-24 DIAGNOSIS — F90.9 ADULT ADHD: ICD-10-CM

## 2022-01-24 RX ORDER — LISDEXAMFETAMINE DIMESYLATE 70 MG/1
CAPSULE ORAL
Qty: 30 CAPSULE | Refills: 0 | Status: SHIPPED | OUTPATIENT
Start: 2022-01-24 | End: 2022-02-24 | Stop reason: SDUPTHER

## 2022-01-24 NOTE — TELEPHONE ENCOUNTER
Rx request   Requested Prescriptions     Pending Prescriptions Disp Refills    VYVANSE 70 MG capsule 30 capsule 0     Sig: TAKE 1 CAPSULE BY MOUTH EVERY MORNING     LOV 7/30/2021  Next Visit Date:  Future Appointments   Date Time Provider Vanna Calvo   1/31/2022 10:15 AM DHARMESH Fernandez [facility]

## 2022-01-25 ENCOUNTER — TELEPHONE (OUTPATIENT)
Dept: FAMILY MEDICINE CLINIC | Age: 52
End: 2022-01-25

## 2022-01-25 ENCOUNTER — OFFICE VISIT (OUTPATIENT)
Dept: FAMILY MEDICINE CLINIC | Age: 52
End: 2022-01-25
Payer: COMMERCIAL

## 2022-01-25 VITALS
DIASTOLIC BLOOD PRESSURE: 70 MMHG | RESPIRATION RATE: 16 BRPM | HEART RATE: 70 BPM | TEMPERATURE: 97 F | BODY MASS INDEX: 24.48 KG/M2 | OXYGEN SATURATION: 98 % | WEIGHT: 171 LBS | SYSTOLIC BLOOD PRESSURE: 100 MMHG | HEIGHT: 70 IN

## 2022-01-25 DIAGNOSIS — E51.9 THIAMINE DEFICIENCY: ICD-10-CM

## 2022-01-25 DIAGNOSIS — R60.0 LOWER EXTREMITY EDEMA: ICD-10-CM

## 2022-01-25 DIAGNOSIS — E53.8 FOLATE DEFICIENCY: ICD-10-CM

## 2022-01-25 DIAGNOSIS — I83.93 ASYMPTOMATIC VARICOSE VEINS OF BILATERAL LOWER EXTREMITIES: Primary | ICD-10-CM

## 2022-01-25 DIAGNOSIS — F39 MOOD DISORDER (HCC): ICD-10-CM

## 2022-01-25 PROCEDURE — 99214 OFFICE O/P EST MOD 30 MIN: CPT | Performed by: PHYSICIAN ASSISTANT

## 2022-01-25 NOTE — PROGRESS NOTES
behavioral problems, confusion, dysphoric mood and sleep disturbance. The patient is not nervous/anxious. Past Medical History:   Diagnosis Date    Anxiety disorder 7/30/2015    Depression 7/30/2015    H/O drug abuse ([facility]     Rehab and detox 4/2013, pain pills    Substance abuse ([facility] 7/16/2020     Past Surgical History:   Procedure Laterality Date    CHOLECYSTECTOMY  6-7-13    lapchole with gram    COLONOSCOPY N/A 9/3/2021    COLORECTAL CANCER SCREENING, HIGH RISK performed by Mario Olea MD at [facility] Right 6/27/13    RIH    OTHER SURGICAL HISTORY Right 6/27/13    Diagnostic Laparoscopy, Repair of RIH with medium light perfix plus system     Social History     Socioeconomic History    Marital status:      Spouse name: Not on file    Number of children: Not on file    Years of education: Not on file    Highest education level: Not on file   Occupational History    Not on file   Tobacco Use    Smoking status: Current Every Day Smoker     Packs/day: 1.50     Years: 30.00     Pack years: 45.00     Types: Cigarettes    Smokeless tobacco: Current User   Vaping Use    Vaping Use: Never used   Substance and Sexual Activity    Alcohol use: Not Currently    Drug use: Yes     Types: Marijuana Gelene Chasten)     Comment: occasional    Sexual activity: Not on file   Other Topics Concern    Not on file   Social History Narrative    Not on file     Social Determinants of Health     Financial Resource Strain: Low Risk     Difficulty of Paying Living Expenses: Not hard at all   Food Insecurity:     Worried About [address] in the Last Year: Not on file    [place] of Food in the Last Year: Not on file   Transportation Needs:     Lack of Transportation (Medical): Not on file    Lack of Transportation (Non-Medical):  Not on file   Physical Activity:     Days of Exercise per Week: Not on file    Minutes of Exercise per Session: Not on file Stress:     Feeling of Stress : Not on file   Social Connections:     Frequency of Communication with Friends and Family: Not on file    Frequency of Social Gatherings with Friends and Family: Not on file    Attends Evangelical Services: Not on file    Active Member of Clubs or Organizations: Not on file    Attends Club or Organization Meetings: Not on file    Marital Status: Not on file   Intimate Partner Violence:     Fear of Current or Ex-Partner: Not on file    Emotionally Abused: Not on file    Physically Abused: Not on file    Sexually Abused: Not on file   Housing Stability:     Unable to Pay for Housing in the Last Year: Not on file    Number of [place] in the Last Year: Not on file    Unstable Housing in the Last Year: Not on file     Family History   Problem Relation Age of Onset    Cancer Mother     Depression Mother     Cancer Father         Colon    High Cholesterol Father     Colon Cancer Father      No Known Allergies  Current Outpatient Medications   Medication Sig Dispense Refill    VYVANSE 70 MG capsule TAKE 1 CAPSULE BY MOUTH EVERY MORNING 30 capsule 0    folic acid (FOLVITE) 1 MG tablet TAKE 1 TABLET BY MOUTH EVERY DAY 30 tablet 8    Thiamine 50 MG CAPS Take 100 mg by mouth daily 60 capsule 1    hydroCHLOROthiazide (MICROZIDE) 12.5 MG capsule TAKE 1 CAPSULE BY MOUTH EVERY DAY IN THE MORNING 30 capsule 5    clotrimazole-betamethasone (LOTRISONE) 1-0.05 % cream APPLY TOPICALLY 2 TIMES DAILY TO FEET. 45 g 1    buprenorphine-naloxone (SUBOXONE) 8-2 MG FILM SL film DISSOLVE 1 (ONE) film UNDER THE TONGUE EVERY DAY      Elastic Bandages & Supports (MEDICAL COMPRESSION STOCKINGS) MISC 1 each by Does not apply route daily Knee high 15-20 mmhg compression stockings both legs wear daily during day and off Qhs. Wear as tolerated. Do not wear if they cause increased pain or lesions. 1 each 5     No current facility-administered medications for this visit.      PMH, Surgical Hx, Family Hx, and Social Hx reviewed and updated. Health Maintenance reviewed. Objective  Vitals:    01/25/22 1440   BP: 100/70   Site: Left Upper Arm   Position: Sitting   Cuff Size: Medium Adult   Pulse: 70   Resp: 16   Temp: 97 °F (36.1 °C)   TempSrc: Temporal   SpO2: 98%   Weight: 171 lb (77.6 kg)   Height: 5' 10\" (1.778 m)     BP Readings from Last 3 Encounters:   01/25/22 100/70   12/16/21 110/82   09/03/21 132/87     Wt Readings from Last 3 Encounters:   01/25/22 171 lb (77.6 kg)   12/16/21 170 lb (77.1 kg)   09/03/21 170 lb (77.1 kg)     Physical Exam  Vitals reviewed. Constitutional:       Appearance: Normal appearance. HENT:      Head: Normocephalic and atraumatic. Right Ear: External ear normal.      Left Ear: External ear normal.      Nose: Nose normal.      Mouth/Throat:      Mouth: Mucous membranes are moist.      Pharynx: No oropharyngeal exudate or posterior oropharyngeal erythema. Comments: Tongue smooth, normal in appearance    Eyes:      Conjunctiva/sclera: Conjunctivae normal.   Cardiovascular:      Rate and Rhythm: Normal rate and regular rhythm. Pulmonary:      Effort: Pulmonary effort is normal.      Breath sounds: Normal breath sounds. Musculoskeletal:      Right lower leg: Edema present. Left lower leg: Edema present. Comments: Trace/pedal edema of legs. Skin:     General: Skin is warm and dry. Neurological:      General: No focal deficit present. Mental Status: He is alert and oriented to person, place, and time. Psychiatric:         Mood and Affect: Mood normal.         Behavior: Behavior normal.         Thought Content: Thought content normal.         Judgment: Judgment normal.       Assessment & Plan   Sophia Valladares was seen today for 6 month follow-up and edema.     Diagnoses and all orders for this visit:    Asymptomatic varicose veins of bilateral lower extremities    Folate deficiency    Lower extremity edema    Mood disorder (HCC)    Thiamine deficiency    Continue current medications. Reviewed chart today. Continue thiamine. Discussed lower sodium foods/options as patient has higher sodium intake/diet. 3 month follow up. No orders of the defined types were placed in this encounter. No orders of the defined types were placed in this encounter. Medications Discontinued During This Encounter   Medication Reason    cloNIDine (CATAPRES) 0.1 MG tablet LIST CLEANUP    linaclotide (LINZESS) 145 MCG capsule LIST CLEANUP    lamoTRIgine (LAMICTAL) 100 MG tablet LIST CLEANUP    ARIPiprazole (ABILIFY) 10 MG tablet LIST CLEANUP    pravastatin (PRAVACHOL) 20 MG tablet LIST CLEANUP     Return in about 3 months (around 4/25/2022). Reviewed with the patient: current clinical status, medications, activities and diet. Side effects, adverse effects of the medication prescribed today, as well as treatment plan/ rationale and result expectations have been discussed with the patient who expresses understanding and desires to proceed. Close follow up to evaluate treatment results and for coordination of care. I have reviewed the patient's medical history in detail and updated the computerized patient record.     Pati Mustafa PA-C

## 2022-02-01 ENCOUNTER — TELEPHONE (OUTPATIENT)
Dept: FAMILY MEDICINE CLINIC | Age: 52
End: 2022-02-01

## 2022-02-01 NOTE — TELEPHONE ENCOUNTER
----- Message from Tali Sanabria sent at 1/31/2022  4:27 PM EST -----  Subject: Message to Provider    QUESTIONS  Information for Provider? Pt returning call regarding test results. I was   unable to get in contact with the office. ---------------------------------------------------------------------------  --------------  Rupal MANCINI  What is the best way for the office to contact you? OK to leave message on   voicemail  Preferred Call Back Phone Number?  [phone]  ---------------------------------------------------------------------------  --------------  SCRIPT ANSWERS  undefined

## 2022-02-02 PROBLEM — F19.10 SUBSTANCE ABUSE (HCC): Status: RESOLVED | Noted: 2020-07-16 | Resolved: 2022-02-02

## 2022-02-02 PROBLEM — E51.9 THIAMINE DEFICIENCY: Status: ACTIVE | Noted: 2022-02-02

## 2022-02-02 ASSESSMENT — ENCOUNTER SYMPTOMS
WHEEZING: 0
CHEST TIGHTNESS: 0
COUGH: 0
COLOR CHANGE: 0
TROUBLE SWALLOWING: 0
SHORTNESS OF BREATH: 0
VOICE CHANGE: 0
ABDOMINAL PAIN: 0
ABDOMINAL DISTENTION: 0

## 2022-02-24 DIAGNOSIS — F90.9 ADULT ADHD: ICD-10-CM

## 2022-02-24 RX ORDER — LISDEXAMFETAMINE DIMESYLATE 70 MG/1
CAPSULE ORAL
Qty: 30 CAPSULE | Refills: 0 | Status: SHIPPED | OUTPATIENT
Start: 2022-02-24 | End: 2022-03-24 | Stop reason: SDUPTHER

## 2022-02-24 NOTE — TELEPHONE ENCOUNTER
Rx request   Requested Prescriptions     Pending Prescriptions Disp Refills    VYVANSE 70 MG capsule 30 capsule 0     Sig: TAKE 1 CAPSULE BY MOUTH EVERY MORNING     LOV 1/25/2022  Next Visit Date:  Future Appointments   Date Time Provider Vanna Calvo   4/25/2022 11:00 AM Estuardo Quach PA-C [address]

## 2022-03-23 DIAGNOSIS — F90.9 ADULT ADHD: ICD-10-CM

## 2022-03-23 NOTE — TELEPHONE ENCOUNTER
Rx request   Requested Prescriptions     Pending Prescriptions Disp Refills    VYVANSE 70 MG capsule 30 capsule 0     Sig: TAKE 1 CAPSULE BY MOUTH EVERY MORNING     LOV 1/25/2022  Next Visit Date:  Future Appointments   Date Time Provider Vanna Calvo   4/25/2022 11:00  S Oliverio Ponce, PARadhaC [address]

## 2022-03-24 RX ORDER — LISDEXAMFETAMINE DIMESYLATE 70 MG/1
CAPSULE ORAL
Qty: 30 CAPSULE | Refills: 0 | Status: SHIPPED | OUTPATIENT
Start: 2022-03-24 | End: 2022-04-21 | Stop reason: SDUPTHER

## 2022-04-21 DIAGNOSIS — F90.9 ADULT ADHD: ICD-10-CM

## 2022-04-21 RX ORDER — LISDEXAMFETAMINE DIMESYLATE 70 MG/1
CAPSULE ORAL
Qty: 30 CAPSULE | Refills: 0 | Status: SHIPPED | OUTPATIENT
Start: 2022-04-21 | End: 2022-05-19 | Stop reason: SDUPTHER

## 2022-04-25 ENCOUNTER — OFFICE VISIT (OUTPATIENT)
Dept: FAMILY MEDICINE CLINIC | Age: 52
End: 2022-04-25
Payer: COMMERCIAL

## 2022-04-25 VITALS
BODY MASS INDEX: 24.05 KG/M2 | WEIGHT: 168 LBS | DIASTOLIC BLOOD PRESSURE: 68 MMHG | OXYGEN SATURATION: 97 % | HEIGHT: 70 IN | RESPIRATION RATE: 16 BRPM | HEART RATE: 60 BPM | SYSTOLIC BLOOD PRESSURE: 110 MMHG

## 2022-04-25 DIAGNOSIS — F90.9 ADULT ADHD: ICD-10-CM

## 2022-04-25 DIAGNOSIS — E78.2 MIXED HYPERLIPIDEMIA: ICD-10-CM

## 2022-04-25 DIAGNOSIS — E51.9 THIAMINE DEFICIENCY: ICD-10-CM

## 2022-04-25 DIAGNOSIS — F39 MOOD DISORDER (HCC): ICD-10-CM

## 2022-04-25 DIAGNOSIS — K04.7 DENTAL INFECTION: Primary | ICD-10-CM

## 2022-04-25 DIAGNOSIS — R60.0 LOWER EXTREMITY EDEMA: ICD-10-CM

## 2022-04-25 DIAGNOSIS — Z72.0 TOBACCO ABUSE: ICD-10-CM

## 2022-04-25 LAB
ALBUMIN SERPL-MCNC: 4.4 G/DL (ref 3.5–4.6)
ALP BLD-CCNC: 70 U/L (ref 35–104)
ALT SERPL-CCNC: 15 U/L (ref 0–41)
ANION GAP SERPL CALCULATED.3IONS-SCNC: 15 MEQ/L (ref 9–15)
AST SERPL-CCNC: 13 U/L (ref 0–40)
BILIRUB SERPL-MCNC: 0.4 MG/DL (ref 0.2–0.7)
BUN BLDV-MCNC: 14 MG/DL (ref 6–20)
CALCIUM SERPL-MCNC: 9.6 MG/DL (ref 8.5–9.9)
CHLORIDE BLD-SCNC: 100 MEQ/L (ref 95–107)
CHOLESTEROL, FASTING: 174 MG/DL (ref 0–199)
CO2: 24 MEQ/L (ref 20–31)
CREAT SERPL-MCNC: 0.88 MG/DL (ref 0.7–1.2)
GFR AFRICAN AMERICAN: >60
GFR NON-AFRICAN AMERICAN: >60
GLOBULIN: 2.9 G/DL (ref 2.3–3.5)
GLUCOSE BLD-MCNC: 98 MG/DL (ref 70–99)
HCT VFR BLD CALC: 49 % (ref 42–52)
HDLC SERPL-MCNC: 39 MG/DL (ref 40–59)
HEMOGLOBIN: 16.4 G/DL (ref 14–18)
LDL CHOLESTEROL CALCULATED: 122 MG/DL (ref 0–129)
MCH RBC QN AUTO: 29.4 PG (ref 27–31.3)
MCHC RBC AUTO-ENTMCNC: 33.4 % (ref 33–37)
MCV RBC AUTO: 88.2 FL (ref 80–100)
PDW BLD-RTO: 13.1 % (ref 11.5–14.5)
PLATELET # BLD: 196 K/UL (ref 130–400)
POTASSIUM SERPL-SCNC: 3.7 MEQ/L (ref 3.4–4.9)
RBC # BLD: 5.56 M/UL (ref 4.7–6.1)
SODIUM BLD-SCNC: 139 MEQ/L (ref 135–144)
TOTAL PROTEIN: 7.3 G/DL (ref 6.3–8)
TRIGLYCERIDE, FASTING: 67 MG/DL (ref 0–150)
WBC # BLD: 8.7 K/UL (ref 4.8–10.8)

## 2022-04-25 PROCEDURE — 99406 BEHAV CHNG SMOKING 3-10 MIN: CPT | Performed by: PHYSICIAN ASSISTANT

## 2022-04-25 PROCEDURE — 99214 OFFICE O/P EST MOD 30 MIN: CPT | Performed by: PHYSICIAN ASSISTANT

## 2022-04-25 RX ORDER — AMOXICILLIN AND CLAVULANATE POTASSIUM 875; 125 MG/1; MG/1
1 TABLET, FILM COATED ORAL 2 TIMES DAILY
Qty: 20 TABLET | Refills: 1 | Status: SHIPPED | OUTPATIENT
Start: 2022-04-25 | End: 2022-05-05

## 2022-04-25 RX ORDER — IBUPROFEN 800 MG/1
TABLET ORAL
COMMUNITY
Start: 2022-03-07 | End: 2022-04-25 | Stop reason: SDUPTHER

## 2022-04-25 RX ORDER — BUPROPION HYDROCHLORIDE 150 MG/1
150 TABLET ORAL EVERY MORNING
Qty: 30 TABLET | Refills: 3 | Status: SHIPPED | OUTPATIENT
Start: 2022-04-25 | End: 2022-05-30

## 2022-04-25 RX ORDER — NICOTINE 21 MG/24HR
1 PATCH, TRANSDERMAL 24 HOURS TRANSDERMAL EVERY 24 HOURS
Qty: 30 PATCH | Refills: 3 | Status: SHIPPED | OUTPATIENT
Start: 2022-04-25 | End: 2023-04-25

## 2022-04-25 RX ORDER — IBUPROFEN 800 MG/1
TABLET ORAL
Qty: 120 TABLET | Refills: 1 | Status: SHIPPED | OUTPATIENT
Start: 2022-04-25 | End: 2022-07-03

## 2022-04-25 SDOH — ECONOMIC STABILITY: FOOD INSECURITY: WITHIN THE PAST 12 MONTHS, YOU WORRIED THAT YOUR FOOD WOULD RUN OUT BEFORE YOU GOT MONEY TO BUY MORE.: NEVER TRUE

## 2022-04-25 SDOH — ECONOMIC STABILITY: FOOD INSECURITY: WITHIN THE PAST 12 MONTHS, THE FOOD YOU BOUGHT JUST DIDN'T LAST AND YOU DIDN'T HAVE MONEY TO GET MORE.: NEVER TRUE

## 2022-04-25 ASSESSMENT — ENCOUNTER SYMPTOMS
CHEST TIGHTNESS: 0
VOICE CHANGE: 0
SHORTNESS OF BREATH: 0
ABDOMINAL PAIN: 0
ABDOMINAL DISTENTION: 0
COLOR CHANGE: 0
COUGH: 0
TROUBLE SWALLOWING: 0
WHEEZING: 0

## 2022-04-25 ASSESSMENT — SOCIAL DETERMINANTS OF HEALTH (SDOH): HOW HARD IS IT FOR YOU TO PAY FOR THE VERY BASICS LIKE FOOD, HOUSING, MEDICAL CARE, AND HEATING?: NOT HARD AT ALL

## 2022-04-25 ASSESSMENT — PATIENT HEALTH QUESTIONNAIRE - PHQ9
2. FEELING DOWN, DEPRESSED OR HOPELESS: 0
SUM OF ALL RESPONSES TO PHQ QUESTIONS 1-9: 0
SUM OF ALL RESPONSES TO PHQ QUESTIONS 1-9: 0
1. LITTLE INTEREST OR PLEASURE IN DOING THINGS: 0
SUM OF ALL RESPONSES TO PHQ QUESTIONS 1-9: 0
SUM OF ALL RESPONSES TO PHQ9 QUESTIONS 1 & 2: 0
SUM OF ALL RESPONSES TO PHQ QUESTIONS 1-9: 0

## 2022-04-25 NOTE — PROGRESS NOTES
Subjective  Debra Eye, 49 y.o. male presents today with:  Chief Complaint   Patient presents with    3 Month Follow-Up    Abscess     possible infection in tooth would like an antibiotic     HPI  Denny Smith is in the office today for routine, 3 month follow up. Last OV with me: 22    LE edema.   Work up with IR-had LE venous studies to evaluate for insufficiency.  NO significant venous insufficiency.  +varicose veins, bilateral.  Compression socks ordered for patient. Admits to not wearing daily/consistently. Remains on 12.5 mg HCTZ. Noted great improvement in swelling when he adheres to lower sodium diet.      Folate and b1 def. Remains on replacement therapy.       ADHD. Mood disorder. Stable on current medications. Denies worsening depressed mood/anxiety.      Dental infection. C/o upper L molar that is loose and infected. +dental pain, gum swelling.  +thermal sensitivity. Similar to past infections. Needs to see dentist.   Taking 800 mg ibuprofen with some relief of pain. Tobacco abuse. Smoking 1.5 ppd. Would like to quit. Previously quit when he was in rehab, didn't smoke for about 3 years but started again when family member . Review of Systems   Constitutional: Negative for activity change, appetite change, chills, diaphoresis, fatigue and fever. HENT: Positive for dental problem. Negative for congestion, mouth sores, postnasal drip, trouble swallowing and voice change. Eyes: Negative for visual disturbance. Respiratory: Negative for cough, chest tightness, shortness of breath and wheezing. Cardiovascular: Negative for chest pain, palpitations and leg swelling. Gastrointestinal: Negative for abdominal distention and abdominal pain. Genitourinary: Negative for difficulty urinating, dysuria, frequency, hematuria, penile pain, penile swelling, scrotal swelling, testicular pain and urgency. Skin: Negative for color change and pallor.    Neurological: Negative for dizziness, seizures, speech difficulty, weakness and numbness. Psychiatric/Behavioral: Negative for agitation, behavioral problems, confusion, dysphoric mood and sleep disturbance. The patient is not nervous/anxious. Past Medical History:   Diagnosis Date    Anxiety disorder 7/30/2015    Depression 7/30/2015    H/O drug abuse ([facility] [address]     Rehab and detox 4/2013, pain pills    Substance abuse ([facility] 7/16/2020     Past Surgical History:   Procedure Laterality Date    CHOLECYSTECTOMY  6-7-13    lapchole with gram    COLONOSCOPY N/A 9/3/2021    COLORECTAL CANCER SCREENING, HIGH RISK performed by Oliva Mendez MD at [facility] Right 6/27/13    RIH    OTHER SURGICAL HISTORY Right 6/27/13    Diagnostic Laparoscopy, Repair of RIH with medium light perfix plus system     Social History     Socioeconomic History    Marital status:      Spouse name: Not on file    Number of children: Not on file    Years of education: Not on file    Highest education level: Not on file   Occupational History    Not on file   Tobacco Use    Smoking status: Current Every Day Smoker     Packs/day: 1.50     Years: 30.00     Pack years: 45.00     Types: Cigarettes    Smokeless tobacco: Current User   Vaping Use    Vaping Use: Never used   Substance and Sexual Activity    Alcohol use: Not Currently    Drug use: Yes     Types: Marijuana Rodena Capes)     Comment: occasional    Sexual activity: Not on file   Other Topics Concern    Not on file   Social History Narrative    Not on file     Social Determinants of Health     Financial Resource Strain: Low Risk     Difficulty of Paying Living Expenses: Not hard at all   Food Insecurity: No Food Insecurity    Worried About Running Out of Food in the Last Year: Never true    [place] of Food in the Last Year: Never true   Transportation Needs:     Lack of Transportation (Medical):  Not on file    Lack of Transportation (Non-Medical):  Not on file   Physical Activity:     Days of Exercise per Week: Not on file    Minutes of Exercise per Session: Not on file   Stress:     Feeling of Stress : Not on file   Social Connections:     Frequency of Communication with Friends and Family: Not on file    Frequency of Social Gatherings with Friends and Family: Not on file    Attends Tenri[employer] Services: Not on file    Active Member of [address] [place] [address] or Organizations: Not on file    Attends Club or Organization Meetings: Not on file    Marital Status: Not on file   Intimate Partner Violence:     Fear of Current or Ex-Partner: Not on file    Emotionally Abused: Not on file    Physically Abused: Not on file    Sexually Abused: Not on file   Housing Stability:     Unable to Pay for Housing in the Last Year: Not on file    Number of [place] in the Last Year: Not on file    Unstable Housing in the Last Year: Not on file     Family History   Problem Relation Age of Onset    Cancer Mother     Depression Mother     Cancer Father         Colon    High Cholesterol Father     Colon Cancer Father      No Known Allergies  Current Outpatient Medications   Medication Sig Dispense Refill    amoxicillin-clavulanate (AUGMENTIN) 875-125 MG per tablet Take 1 tablet by mouth 2 times daily for 10 days 20 tablet 1    ibuprofen (ADVIL;MOTRIN) 800 MG tablet TAKE 1 TABLET BY MOUTH EVERY 6-8 HOURS AS NEEDED FOR PAIN 120 tablet 1    buPROPion (WELLBUTRIN XL) 150 MG extended release tablet Take 1 tablet by mouth every morning 30 tablet 3    nicotine (NICODERM CQ) 21 MG/24HR Place 1 patch onto the skin every 24 hours 30 patch 3    VYVANSE 70 MG capsule TAKE 1 CAPSULE BY MOUTH EVERY MORNING 30 capsule 0    folic acid (FOLVITE) 1 MG tablet TAKE 1 TABLET BY MOUTH EVERY DAY 30 tablet 8    Thiamine 50 MG CAPS Take 100 mg by mouth daily 60 capsule 1    hydroCHLOROthiazide (MICROZIDE) 12.5 MG capsule TAKE 1 CAPSULE BY MOUTH EVERY DAY IN THE MORNING 30 capsule 5    buprenorphine-naloxone (SUBOXONE) 8-2 MG FILM SL film DISSOLVE 1 (ONE) film UNDER THE TONGUE EVERY DAY       No current facility-administered medications for this visit. PMH, Surgical Hx, Family Hx, and Social Hx reviewed and updated. Health Maintenance reviewed. Objective  Vitals:    04/25/22 1113 04/25/22 1158   BP: (!) 110/90 110/68   Site: Left Upper Arm Left Upper Arm   Position: Sitting Sitting   Cuff Size: Medium Adult Medium Adult   Pulse: 60    Resp: 16    SpO2: 97%    Weight: 168 lb (76.2 kg)    Height: 5' 10\" (1.778 m)      BP Readings from Last 3 Encounters:   04/25/22 110/68   01/25/22 100/70   12/16/21 110/82     Wt Readings from Last 3 Encounters:   04/25/22 168 lb (76.2 kg)   01/25/22 171 lb (77.6 kg)   12/16/21 170 lb (77.1 kg)     Physical Exam  Vitals reviewed. Constitutional:       Appearance: Normal appearance. HENT:      Head: Normocephalic and atraumatic. Right Ear: External ear normal.      Left Ear: External ear normal.      Nose: Nose normal.      Mouth/Throat:      Mouth: Mucous membranes are moist.      Dentition: Abnormal dentition. Dental tenderness and gingival swelling present. No gum lesions. Eyes:      Conjunctiva/sclera: Conjunctivae normal.   Cardiovascular:      Rate and Rhythm: Normal rate and regular rhythm. Pulmonary:      Effort: Pulmonary effort is normal.      Breath sounds: Normal breath sounds. Musculoskeletal:         General: No tenderness. Right lower leg: No edema. Left lower leg: No edema. Skin:     General: Skin is warm. Neurological:      General: No focal deficit present. Mental Status: He is alert and oriented to person, place, and time. Psychiatric:         Mood and Affect: Mood normal.         Behavior: Behavior normal.         Thought Content: Thought content normal.         Judgment: Judgment normal.       Assessment & Plan   Jacy Laws was seen today for 3 month follow-up and abscess.     Diagnoses and all orders for this visit:    Dental infection  -     amoxicillin-clavulanate (AUGMENTIN) 875-125 MG per tablet; Take 1 tablet by mouth 2 times daily for 10 days  -     ibuprofen (ADVIL;MOTRIN) 800 MG tablet; TAKE 1 TABLET BY MOUTH EVERY 6-8 HOURS AS NEEDED FOR PAIN    Mood disorder (HCC)  -     CBC; Future  -     Comprehensive Metabolic Panel; Future    Adult ADHD    Mixed hyperlipidemia  -     Lipid, Fasting; Future    Tobacco abuse  -     buPROPion (WELLBUTRIN XL) 150 MG extended release tablet; Take 1 tablet by mouth every morning  -     nicotine (NICODERM CQ) 21 MG/24HR; Place 1 patch onto the skin every 24 hours  -     SD TOBACCO USE CESSATION INTERMEDIATE 3-10 MINUTES    Thiamine deficiency  -     Vitamin B1; Future    Lower extremity edema    Routine labs today, he is fasting. Discussed smoking cessation; start wellbutrin and nicoderm patch. No history of seizure disorder. 4 week follow up with me.      Orders Placed This Encounter   Procedures    CBC     Standing Status:   Future     Number of Occurrences:   1     Standing Expiration Date:   4/25/2023    Comprehensive Metabolic Panel     Standing Status:   Future     Number of Occurrences:   1     Standing Expiration Date:   4/25/2023    Lipid, Fasting     Standing Status:   Future     Number of Occurrences:   1     Standing Expiration Date:   4/25/2023    Vitamin B1     Standing Status:   Future     Number of Occurrences:   1     Standing Expiration Date:   4/25/2023    SD TOBACCO USE CESSATION INTERMEDIATE 3-10 MINUTES     Orders Placed This Encounter   Medications    amoxicillin-clavulanate (AUGMENTIN) 875-125 MG per tablet     Sig: Take 1 tablet by mouth 2 times daily for 10 days     Dispense:  20 tablet     Refill:  1    ibuprofen (ADVIL;MOTRIN) 800 MG tablet     Sig: TAKE 1 TABLET BY MOUTH EVERY 6-8 HOURS AS NEEDED FOR PAIN     Dispense:  120 tablet     Refill:  1    buPROPion (WELLBUTRIN XL) 150 MG extended release tablet     Sig: Take 1 tablet by mouth every morning     Dispense:  30 tablet     Refill:  3    nicotine (NICODERM CQ) 21 MG/24HR     Sig: Place 1 patch onto the skin every 24 hours     Dispense:  30 patch     Refill:  3     Medications Discontinued During This Encounter   Medication Reason    clotrimazole-betamethasone (LOTRISONE) 1-0.05 % cream LIST CLEANUP    Elastic Bandages & Supports (MEDICAL COMPRESSION STOCKINGS) MISC LIST CLEANUP    ibuprofen (ADVIL;MOTRIN) 800 MG tablet REORDER     Return in about 4 weeks (around 5/23/2022) for follow up in office. Reviewed with the patient: current clinical status, medications, activities and diet. Side effects, adverse effects of the medication prescribed today, as well as treatment plan/ rationale and result expectations have been discussed with the patient who expresses understanding and desires to proceed. Close follow up to evaluate treatment results and for coordination of care. I have reviewed the patient's medical history in detail and updated the computerized patient record.     Pati Mustafa PA-C

## 2022-04-28 LAB — VITAMIN B1, PLASMA: 58 NMOL/L (ref 4–15)

## 2022-05-15 DIAGNOSIS — R60.0 LOWER EXTREMITY EDEMA: ICD-10-CM

## 2022-05-16 RX ORDER — HYDROCHLOROTHIAZIDE 12.5 MG/1
CAPSULE, GELATIN COATED ORAL
Qty: 30 CAPSULE | Refills: 5 | Status: SHIPPED | OUTPATIENT
Start: 2022-05-16

## 2022-05-16 NOTE — TELEPHONE ENCOUNTER
pharmacy requesting medication refill.  Please approve or deny this request.    Rx requested:  Requested Prescriptions     Pending Prescriptions Disp Refills    hydroCHLOROthiazide (MICROZIDE) 12.5 MG capsule [Pharmacy Med Name: HYDROCHLOROTHIAZIDE 12.5 MG CP] 30 capsule 5     Sig: TAKE 1 CAPSULE BY MOUTH EVERY DAY IN THE MORNING         Last Office Visit:   4/25/2022      Next Visit Date:  Future Appointments   Date Time Provider Vanna Calvo   5/23/2022 11:30 AM DHARMESH Bunn [facility]

## 2022-05-18 DIAGNOSIS — F90.9 ADULT ADHD: ICD-10-CM

## 2022-05-19 RX ORDER — LISDEXAMFETAMINE DIMESYLATE 70 MG/1
CAPSULE ORAL
Qty: 30 CAPSULE | Refills: 0 | Status: SHIPPED | OUTPATIENT
Start: 2022-05-19 | End: 2022-06-15 | Stop reason: SDUPTHER

## 2022-05-25 DIAGNOSIS — Z72.0 TOBACCO ABUSE: ICD-10-CM

## 2022-05-27 NOTE — TELEPHONE ENCOUNTER
pharmacy requesting medication refill.  Please approve or deny this request.    Rx requested:  Requested Prescriptions     Pending Prescriptions Disp Refills    buPROPion (WELLBUTRIN XL) 150 MG extended release tablet [Pharmacy Med Name: BUPROPION HCL  MG TABLET] 30 tablet 3     Sig: TAKE 1 TABLET BY MOUTH EVERY DAY IN THE MORNING         Last Office Visit:   4/25/2022      Next Visit Date:  Future Appointments   Date Time Provider Vanna Calvo   7/1/2022  2:45 PM DHARMESH Culp [facility]

## 2022-05-30 RX ORDER — BUPROPION HYDROCHLORIDE 150 MG/1
TABLET ORAL
Qty: 30 TABLET | Refills: 3 | Status: SHIPPED | OUTPATIENT
Start: 2022-05-30

## 2022-06-15 ENCOUNTER — TELEPHONE (OUTPATIENT)
Dept: FAMILY MEDICINE CLINIC | Age: 52
End: 2022-06-15

## 2022-06-15 DIAGNOSIS — F90.9 ADULT ADHD: ICD-10-CM

## 2022-06-15 RX ORDER — LISDEXAMFETAMINE DIMESYLATE 70 MG/1
CAPSULE ORAL
Qty: 30 CAPSULE | Refills: 0 | Status: SHIPPED | OUTPATIENT
Start: 2022-06-19 | End: 2022-07-20 | Stop reason: SDUPTHER

## 2022-06-15 NOTE — TELEPHONE ENCOUNTER
Rx request   Requested Prescriptions     Pending Prescriptions Disp Refills    VYVANSE 70 MG capsule 30 capsule 0     Sig: TAKE 1 CAPSULE BY MOUTH EVERY MORNING     LOV 4/25/2022  Next Visit Date:  Future Appointments   Date Time Provider Vanna Calvo   7/1/2022  2:45 PM Chelsie Tomlin PA-C [address]

## 2022-06-29 DIAGNOSIS — K04.7 DENTAL INFECTION: ICD-10-CM

## 2022-06-30 NOTE — TELEPHONE ENCOUNTER
Comments: This request is coming from the pharmacy. Last Office Visit (last PCP visit):   4/25/2022    Next Visit Date:  Future Appointments   Date Time Provider Vanna Calvo   7/1/2022  2:45 PM DHARMESH Sanchez [facility]       If hasn't been seen in over a year OR hasn't followed up according to last diabetes/ADHD visit, make appointment for patient before sending refill to provider.     Rx requested:  Requested Prescriptions     Pending Prescriptions Disp Refills    ibuprofen (ADVIL;MOTRIN) 800 MG tablet [Pharmacy Med Name: IBUPROFEN 800 MG TABLET] 120 tablet 1     Sig: TAKE 1 TABLET BY MOUTH EVERY 6 TO 8 HOURS AS NEEDED FOR PAIN

## 2022-07-03 RX ORDER — IBUPROFEN 800 MG/1
TABLET ORAL
Qty: 120 TABLET | Refills: 1 | Status: SHIPPED | OUTPATIENT
Start: 2022-07-03

## 2022-07-19 DIAGNOSIS — F90.9 ADULT ADHD: ICD-10-CM

## 2022-07-20 RX ORDER — LISDEXAMFETAMINE DIMESYLATE 70 MG/1
CAPSULE ORAL
Qty: 30 CAPSULE | Refills: 0 | Status: SHIPPED | OUTPATIENT
Start: 2022-07-20 | End: 2022-08-15 | Stop reason: SDUPTHER

## 2022-08-15 ENCOUNTER — OFFICE VISIT (OUTPATIENT)
Dept: FAMILY MEDICINE CLINIC | Age: 52
End: 2022-08-15
Payer: COMMERCIAL

## 2022-08-15 VITALS
SYSTOLIC BLOOD PRESSURE: 100 MMHG | DIASTOLIC BLOOD PRESSURE: 70 MMHG | HEIGHT: 70 IN | HEART RATE: 101 BPM | BODY MASS INDEX: 23.48 KG/M2 | RESPIRATION RATE: 16 BRPM | OXYGEN SATURATION: 99 % | WEIGHT: 164 LBS

## 2022-08-15 DIAGNOSIS — N52.9 ERECTILE DYSFUNCTION, UNSPECIFIED ERECTILE DYSFUNCTION TYPE: ICD-10-CM

## 2022-08-15 DIAGNOSIS — E53.8 FOLATE DEFICIENCY: ICD-10-CM

## 2022-08-15 DIAGNOSIS — I83.93 ASYMPTOMATIC VARICOSE VEINS OF BILATERAL LOWER EXTREMITIES: ICD-10-CM

## 2022-08-15 DIAGNOSIS — F39 MOOD DISORDER (HCC): ICD-10-CM

## 2022-08-15 DIAGNOSIS — F90.9 ADULT ADHD: Primary | ICD-10-CM

## 2022-08-15 DIAGNOSIS — E51.9 THIAMINE DEFICIENCY: ICD-10-CM

## 2022-08-15 PROBLEM — K04.7 DENTAL INFECTION: Status: RESOLVED | Noted: 2022-04-25 | Resolved: 2022-08-15

## 2022-08-15 PROCEDURE — 99214 OFFICE O/P EST MOD 30 MIN: CPT | Performed by: PHYSICIAN ASSISTANT

## 2022-08-15 RX ORDER — LISDEXAMFETAMINE DIMESYLATE 70 MG/1
CAPSULE ORAL
Qty: 30 CAPSULE | Refills: 0 | Status: SHIPPED | OUTPATIENT
Start: 2022-10-19 | End: 2022-08-22 | Stop reason: SDUPTHER

## 2022-08-15 RX ORDER — SILDENAFIL 50 MG/1
50 TABLET, FILM COATED ORAL PRN
Qty: 9 TABLET | Refills: 3 | Status: SHIPPED | OUTPATIENT
Start: 2022-08-15

## 2022-08-15 RX ORDER — LISDEXAMFETAMINE DIMESYLATE 70 MG/1
CAPSULE ORAL
Qty: 30 CAPSULE | Refills: 0 | Status: SHIPPED | OUTPATIENT
Start: 2022-09-19 | End: 2022-08-15 | Stop reason: SDUPTHER

## 2022-08-15 ASSESSMENT — ENCOUNTER SYMPTOMS
VOICE CHANGE: 0
ABDOMINAL PAIN: 0
CHEST TIGHTNESS: 0
TROUBLE SWALLOWING: 0
WHEEZING: 0
COUGH: 0
COLOR CHANGE: 0
SHORTNESS OF BREATH: 0
ABDOMINAL DISTENTION: 0

## 2022-08-15 NOTE — PROGRESS NOTES
testicular pain and urgency. ED   Skin:  Negative for color change and pallor. Lesion of R cheek   Neurological:  Negative for dizziness, seizures, speech difficulty, weakness and numbness. Psychiatric/Behavioral:  Negative for agitation, behavioral problems, confusion, dysphoric mood and sleep disturbance. The patient is not nervous/anxious.       Past Medical History:   Diagnosis Date    Anxiety disorder 7/30/2015    Depression 7/30/2015    H/O drug abuse ([facility] [address]     Rehab and detox 4/2013, pain pills    [facility] 7/16/2020     Past Surgical History:   Procedure Laterality Date    CHOLECYSTECTOMY  6-7-13    lapchole with gram    COLONOSCOPY N/A 9/3/2021    COLORECTAL CANCER SCREENING, HIGH RISK performed by Alfredo Andrade MD at [address] [place] [address] Right 6/27/13    RIH    OTHER SURGICAL HISTORY Right 6/27/13    Diagnostic Laparoscopy, Repair of RIH with medium light perfix plus system     Social History     Socioeconomic History    Marital status:      Spouse name: Not on file    Number of children: Not on file    Years of education: Not on file    Highest education level: Not on file   Occupational History    Not on file   Tobacco Use    Smoking status: Every Day     Packs/day: 1.50     Years: 30.00     Pack years: 45.00     Types: Cigarettes    Smokeless tobacco: Current   Vaping Use    Vaping Use: Never used   Substance and Sexual Activity    Alcohol use: Not Currently    Drug use: Yes     Types: Marijuana Trice Chapin)     Comment: occasional    Sexual activity: Not on file   Other Topics Concern    Not on file   Social History Narrative    Not on file     Social Determinants of Health     Financial Resource Strain: Low Risk     Difficulty of Paying Living Expenses: Not hard at all   Food Insecurity: No Food Insecurity    Worried About Running Out of Food in the Last Year: Never true    Ran Out of Food in the Last Year: Never true   Transportation Needs: Not on file   Physical Activity: Not on file   Stress: Not on file   Social Connections: Not on file   Intimate Partner Violence: Not on file   Housing Stability: Not on file     Family History   Problem Relation Age of Onset    Cancer Mother     Depression Mother     Cancer Father         Colon    High Cholesterol Father     Colon Cancer Father      No Known Allergies  Current Outpatient Medications   Medication Sig Dispense Refill    Thiamine 50 MG CAPS Take 100 mg by mouth daily 60 capsule 1    sildenafil (VIAGRA) 50 MG tablet Take 1 tablet by mouth as needed for Erectile Dysfunction 9 tablet 3    [START ON 10/19/2022] VYVANSE 70 MG capsule TAKE 1 CAPSULE BY MOUTH EVERY MORNINGTAKE 1 CAPSULE BY MOUTH EVERY MORNING 30 capsule 0    ibuprofen (ADVIL;MOTRIN) 800 MG tablet TAKE 1 TABLET BY MOUTH EVERY 6 TO 8 HOURS AS NEEDED FOR PAIN 120 tablet 1    buPROPion (WELLBUTRIN XL) 150 MG extended release tablet TAKE 1 TABLET BY MOUTH EVERY DAY IN THE MORNING 30 tablet 3    hydroCHLOROthiazide (MICROZIDE) 12.5 MG capsule TAKE 1 CAPSULE BY MOUTH EVERY DAY IN THE MORNING 30 capsule 5    nicotine (NICODERM CQ) 21 MG/24HR Place 1 patch onto the skin every 24 hours 30 patch 3    folic acid (FOLVITE) 1 MG tablet TAKE 1 TABLET BY MOUTH EVERY DAY 30 tablet 8    buprenorphine-naloxone (SUBOXONE) 8-2 MG FILM SL film DISSOLVE 1 (ONE) film UNDER THE TONGUE EVERY DAY       No current facility-administered medications for this visit. PMH, Surgical Hx, Family Hx, and Social Hx reviewed and updated. Health Maintenance reviewed.     Objective  Vitals:    08/15/22 1129   BP: 100/70   Site: Left Upper Arm   Position: Sitting   Cuff Size: Medium Adult   Pulse: (!) 101   Resp: 16   SpO2: 99%   Weight: 164 lb (74.4 kg)   Height: 5' 10\" (1.778 m)     BP Readings from Last 3 Encounters:   08/15/22 100/70   04/25/22 110/68   01/25/22 100/70     Wt Readings from Last 3 Encounters:   08/15/22 164 lb (74.4 kg)   04/25/22 168 lb (76.2 kg) 01/25/22 171 lb (77.6 kg)     Physical Exam  Vitals reviewed. Constitutional:       Appearance: Normal appearance. HENT:      Head: Normocephalic and atraumatic. Comments: 5 mm slightly raised skin lesion of R cheek. No noted ulceration. Right Ear: External ear normal.      Left Ear: External ear normal.      Nose: Nose normal.   Eyes:      Conjunctiva/sclera: Conjunctivae normal.   Cardiovascular:      Rate and Rhythm: Normal rate and regular rhythm. Pulmonary:      Effort: Pulmonary effort is normal.      Breath sounds: Normal breath sounds. Musculoskeletal:      Right lower leg: No edema. Left lower leg: No edema. Skin:     General: Skin is warm and dry. Neurological:      General: No focal deficit present. Mental Status: He is alert and oriented to person, place, and time. Psychiatric:         Mood and Affect: Mood normal.         Behavior: Behavior normal.         Thought Content: Thought content normal.         Judgment: Judgment normal.     Assessment & Plan   Dulce Mckoy was seen today for follow-up and mole. Diagnoses and all orders for this visit:    Adult ADHD  -     Discontinue: VYVANSE 70 MG capsule; TAKE 1 CAPSULE BY MOUTH EVERY MORNING  -     VYVANSE 70 MG capsule; TAKE 1 CAPSULE BY MOUTH EVERY MORNINGTAKE 1 CAPSULE BY MOUTH EVERY MORNING    Thiamine deficiency  -     Thiamine 50 MG CAPS; Take 100 mg by mouth daily    Erectile dysfunction, unspecified erectile dysfunction type  -     sildenafil (VIAGRA) 50 MG tablet; Take 1 tablet by mouth as needed for Erectile Dysfunction    Asymptomatic varicose veins of bilateral lower extremities    Mood disorder (HCC)    Folate deficiency    Continue replacement therapy. Trial viagra for ED. Vyvanse consistent, OK for 3 months. [facility] report ran and is consistent with current and past history concerning scheduled medications. Return for skin lesion removal/excision.      Orders Placed This Encounter   Medications Thiamine 50 MG CAPS     Sig: Take 100 mg by mouth daily     Dispense:  60 capsule     Refill:  1    DISCONTD: VYVANSE 70 MG capsule     Sig: TAKE 1 CAPSULE BY MOUTH EVERY MORNING     Dispense:  30 capsule     Refill:  0    sildenafil (VIAGRA) 50 MG tablet     Sig: Take 1 tablet by mouth as needed for Erectile Dysfunction     Dispense:  9 tablet     Refill:  3    VYVANSE 70 MG capsule     Sig: TAKE 1 CAPSULE BY MOUTH EVERY MORNINGTAKE 1 CAPSULE BY MOUTH EVERY MORNING     Dispense:  30 capsule     Refill:  0     Medications Discontinued During This Encounter   Medication Reason    Thiamine 50 MG CAPS REORDER    VYVANSE 70 MG capsule REORDER    VYVANSE 70 MG capsule REORDER     Return in about 1 week (around 8/22/2022) for procedure 30 minutes--skin lesion removal face. Reviewed with the patient: current clinical status, medications, activities and diet. Side effects, adverse effects of the medication prescribed today, as well as treatment plan/ rationale and result expectations have been discussed with the patient who expresses understanding and desires to proceed. Close follow up to evaluate treatment results and for coordination of care. I have reviewed the patient's medical history in detail and updated the computerized patient record.     Pati Mustafa PA-C

## 2022-08-22 DIAGNOSIS — F90.9 ADULT ADHD: ICD-10-CM

## 2022-08-22 RX ORDER — LISDEXAMFETAMINE DIMESYLATE 70 MG/1
CAPSULE ORAL
Qty: 30 CAPSULE | Refills: 0 | Status: SHIPPED | OUTPATIENT
Start: 2022-10-19 | End: 2022-08-23

## 2022-08-22 NOTE — TELEPHONE ENCOUNTER
Next ov is 8/23/22. [facility]  The Rx that was sent on 8/15 was not received at [place] [id number].

## 2022-08-22 NOTE — TELEPHONE ENCOUNTER
Pt states that PCP did write out a two month prescription and stated she will send in the third month for him.

## 2022-08-23 ENCOUNTER — PROCEDURE VISIT (OUTPATIENT)
Dept: FAMILY MEDICINE CLINIC | Age: 52
End: 2022-08-23
Payer: COMMERCIAL

## 2022-08-23 VITALS
BODY MASS INDEX: 23.48 KG/M2 | RESPIRATION RATE: 16 BRPM | DIASTOLIC BLOOD PRESSURE: 80 MMHG | WEIGHT: 164 LBS | OXYGEN SATURATION: 97 % | HEART RATE: 87 BPM | SYSTOLIC BLOOD PRESSURE: 120 MMHG | HEIGHT: 70 IN

## 2022-08-23 DIAGNOSIS — F90.9 ADULT ADHD: ICD-10-CM

## 2022-08-23 DIAGNOSIS — D49.89 NEOPLASM OF FACE: Primary | ICD-10-CM

## 2022-08-23 PROCEDURE — 11440 EXC FACE-MM B9+MARG 0.5 CM/<: CPT | Performed by: PHYSICIAN ASSISTANT

## 2022-08-23 RX ORDER — LIDOCAINE HYDROCHLORIDE AND EPINEPHRINE 10; 10 MG/ML; UG/ML
20 INJECTION, SOLUTION INFILTRATION; PERINEURAL ONCE
Status: SHIPPED | OUTPATIENT
Start: 2022-08-23

## 2022-08-23 ASSESSMENT — ENCOUNTER SYMPTOMS: COLOR CHANGE: 0

## 2022-08-23 NOTE — PROGRESS NOTES
Subjective  Samy Lomas, 49 y.o. male presents today with:  Chief Complaint   Patient presents with    Procedure     Lesion removal from eye        HPI  Jose Armando Tong is in the office today for lesion removal of R cheek. Lesion has been present for some time. It is getting larger. Did have something similar that was removed previously. Review of Systems   Constitutional:  Negative for activity change, appetite change, chills and fever. Skin:  Negative for color change and wound. Lesion of R cheek    Hematological:  Does not bruise/bleed easily.      Past Medical History:   Diagnosis Date    Anxiety disorder 7/30/2015    Depression 7/30/2015    H/O drug abuse ([facility]     Rehab and detox 4/2013, pain pills    Substance abuse ([facility] 7/16/2020     Past Surgical History:   Procedure Laterality Date    CHOLECYSTECTOMY  6-7-13    lapchole with gram    COLONOSCOPY N/A 9/3/2021    COLORECTAL CANCER SCREENING, HIGH RISK performed by Elizabeth Youngblood MD at [address] [place] [address] Right 6/27/13    RIH    OTHER SURGICAL HISTORY Right 6/27/13    Diagnostic Laparoscopy, Repair of RIH with medium light perfix plus system     Social History     Socioeconomic History    Marital status:      Spouse name: Not on file    Number of children: Not on file    Years of education: Not on file    Highest education level: Not on file   Occupational History    Not on file   Tobacco Use    Smoking status: Every Day     Packs/day: 1.50     Years: 30.00     Pack years: 45.00     Types: Cigarettes    Smokeless tobacco: Current   Vaping Use    Vaping Use: Never used   Substance and Sexual Activity    Alcohol use: Not Currently    Drug use: Yes     Types: Marijuana Liset Anant)     Comment: occasional    Sexual activity: Not on file   Other Topics Concern    Not on file   Social History Narrative    Not on file     Social Determinants of Health     Financial Resource Strain: Low Risk     Difficulty of Paying Living Expenses: Not hard at all   Food Insecurity: No Food Insecurity    Worried About Running Out of Food in the Last Year: Never true    Ran Out of Food in the Last Year: Never true   Transportation Needs: Not on file   Physical Activity: Not on file   Stress: Not on file   Social Connections: Not on file   Intimate Partner Violence: Not on file   Housing Stability: Not on file     Family History   Problem Relation Age of Onset    Cancer Mother     Depression Mother     Cancer Father         Colon    High Cholesterol Father     Colon Cancer Father      No Known Allergies  Current Outpatient Medications   Medication Sig Dispense Refill    [START ON 10/19/2022] VYVANSE 70 MG capsule TAKE 1 CAPSULE BY MOUTH EVERY MORNINGTAKE 1 CAPSULE BY MOUTH EVERY MORNING 30 capsule 0    Thiamine 50 MG CAPS Take 100 mg by mouth daily 60 capsule 1    sildenafil (VIAGRA) 50 MG tablet Take 1 tablet by mouth as needed for Erectile Dysfunction 9 tablet 3    ibuprofen (ADVIL;MOTRIN) 800 MG tablet TAKE 1 TABLET BY MOUTH EVERY 6 TO 8 HOURS AS NEEDED FOR PAIN 120 tablet 1    buPROPion (WELLBUTRIN XL) 150 MG extended release tablet TAKE 1 TABLET BY MOUTH EVERY DAY IN THE MORNING 30 tablet 3    hydroCHLOROthiazide (MICROZIDE) 12.5 MG capsule TAKE 1 CAPSULE BY MOUTH EVERY DAY IN THE MORNING 30 capsule 5    nicotine (NICODERM CQ) 21 MG/24HR Place 1 patch onto the skin every 24 hours 30 patch 3    folic acid (FOLVITE) 1 MG tablet TAKE 1 TABLET BY MOUTH EVERY DAY 30 tablet 8    buprenorphine-naloxone (SUBOXONE) 8-2 MG FILM SL film DISSOLVE 1 (ONE) film UNDER THE TONGUE EVERY DAY       Current Facility-Administered Medications   Medication Dose Route Frequency Provider Last Rate Last Admin    lidocaine-EPINEPHrine 1 %-1:042969 injection 20 mL  20 mL IntraDERmal Once Pati Mustafa PA-C         PMH, Surgical Hx, Family Hx, and Social Hx reviewed and updated. Health Maintenance reviewed.     Objective  Vitals:    08/23/22 1355   BP: 120/80   Site: Left Upper Arm   Position: Sitting   Cuff Size: Medium Adult   Pulse: 87   Resp: 16   SpO2: 97%   Weight: 164 lb (74.4 kg)   Height: 5' 10\" (1.778 m)     BP Readings from Last 3 Encounters:   08/23/22 120/80   08/15/22 100/70   04/25/22 110/68     Wt Readings from Last 3 Encounters:   08/23/22 164 lb (74.4 kg)   08/15/22 164 lb (74.4 kg)   04/25/22 168 lb (76.2 kg)     Physical Exam  Vitals reviewed. Constitutional:       Appearance: Normal appearance. Cardiovascular:      Rate and Rhythm: Normal rate. Pulmonary:      Effort: Pulmonary effort is normal.   Musculoskeletal:         General: Normal range of motion. Skin:     General: Skin is warm. Findings: Lesion (5 mm skin lesion R cheek) present. Neurological:      General: No focal deficit present. Mental Status: He is alert and oriented to person, place, and time. Procedure; Consent obtained prior to procedure. Proper time out was done prior to start. Skin cleaned with alcohol and marked with skin marker. Lesion and tissue with anesthestized using 1 cc 1% lidocaine w/ epi. Skin cleaned with iodine and alcohol. Skin lesion excised using #15 blade. Bleeding controlled with cautery and pressure. 3 simple knots were placed with good eversion of tissue. Dress with bacitracin and band aid. Assessment & Plan   Peggye Cabot was seen today for procedure. Diagnoses and all orders for this visit:    Neoplasm of face  -     Surgical Pathology; Future  -     lidocaine-EPINEPHrine 1 %-1:757690 injection 20 mL  -     56388 - CO EXC SKIN BENIG <5MM FACE,FACIAL  Return in 1 week for suture removal.   -Patient was instructed to call if signs of infection occur including:    Increased pain, swelling, warmth, or redness in the area. Red streaks leading from the area. Pus draining from the wound. A new or higher fever.     Orders Placed This Encounter   Procedures    Surgical Pathology     Standing Status:   Future     Standing Expiration Date:   8/23/2023     Order Specific Question:   PREVIOUS BIOPSY     Answer:   No     Order Specific Question:   PREOP DIAGNOSIS     Answer:   dermoid cyst, R cheek     Order Specific Question:   FROZEN SECTION - NO OR YES/SPECIMEN     Answer:   No    82548 - FL EXC SKIN BENIG <5MM FACE,FACIAL     Orders Placed This Encounter   Medications    lidocaine-EPINEPHrine 1 %-1:000314 injection 20 mL     There are no discontinued medications. No follow-ups on file. Reviewed with the patient: current clinical status, medications, activities and diet. Side effects, adverse effects of the medication prescribed today, as well as treatment plan/ rationale and result expectations have been discussed with the patient who expresses understanding and desires to proceed. Close follow up to evaluate treatment results and for coordination of care. I have reviewed the patient's medical history in detail and updated the computerized patient record.     Pati Mustafa PA-C

## 2022-09-13 DIAGNOSIS — E51.9 THIAMINE DEFICIENCY: ICD-10-CM

## 2022-09-14 RX ORDER — THIAMINE HCL 50 MG
TABLET ORAL
Qty: 60 TABLET | Refills: 1 | OUTPATIENT
Start: 2022-09-14

## 2022-11-28 DIAGNOSIS — R60.0 LOWER EXTREMITY EDEMA: ICD-10-CM

## 2022-11-28 DIAGNOSIS — E53.8 FOLATE DEFICIENCY: ICD-10-CM

## 2022-11-30 NOTE — TELEPHONE ENCOUNTER
Comments:     Last Office Visit (last PCP visit):   8/23/2022    Next Visit Date:  No future appointments. **If hasn't been seen in over a year OR hasn't followed up according to last diabetes/ADHD visit, make appointment for patient before sending refill to provider.     Rx requested:  Requested Prescriptions     Pending Prescriptions Disp Refills    hydroCHLOROthiazide (MICROZIDE) 12.5 MG capsule [Pharmacy Med Name: HYDROCHLOROTHIAZIDE 12.5 MG CP] 30 capsule 5     Sig: TAKE 1 CAPSULE BY MOUTH EVERY DAY IN THE MORNING    folic acid (FOLVITE) 1 MG tablet [Pharmacy Med Name: FOLIC ACID 1 MG TABLET] 30 tablet 8     Sig: TAKE 1 TABLET BY MOUTH EVERY DAY

## 2022-12-01 RX ORDER — FOLIC ACID 1 MG/1
TABLET ORAL
Qty: 30 TABLET | Refills: 8 | Status: SHIPPED | OUTPATIENT
Start: 2022-12-01

## 2022-12-01 RX ORDER — HYDROCHLOROTHIAZIDE 12.5 MG/1
CAPSULE, GELATIN COATED ORAL
Qty: 30 CAPSULE | Refills: 5 | Status: SHIPPED | OUTPATIENT
Start: 2022-12-01

## 2022-12-05 DIAGNOSIS — F90.9 ADULT ADHD: ICD-10-CM

## 2022-12-27 ENCOUNTER — TELEPHONE (OUTPATIENT)
Dept: FAMILY MEDICINE CLINIC | Age: 52
End: 2022-12-27

## 2022-12-27 DIAGNOSIS — F90.9 ADULT ADHD: ICD-10-CM

## 2022-12-27 NOTE — TELEPHONE ENCOUNTER
Patient is requesting a refill of his Vyvanse 70 mg tablet. He is going out of town on 1/6/2023 and would like to pick the prescription up before he leaves.     LOV  8/23/2022  Scheduled  2/24/2023    [phone] ok to leave a VM

## 2022-12-28 ENCOUNTER — TELEPHONE (OUTPATIENT)
Dept: FAMILY MEDICINE CLINIC | Age: 52
End: 2022-12-28

## 2022-12-28 ENCOUNTER — OFFICE VISIT (OUTPATIENT)
Dept: FAMILY MEDICINE CLINIC | Age: 52
End: 2022-12-28
Payer: COMMERCIAL

## 2022-12-28 VITALS
HEART RATE: 89 BPM | BODY MASS INDEX: 23.19 KG/M2 | HEIGHT: 70 IN | DIASTOLIC BLOOD PRESSURE: 82 MMHG | TEMPERATURE: 97.2 F | OXYGEN SATURATION: 97 % | SYSTOLIC BLOOD PRESSURE: 120 MMHG | WEIGHT: 162 LBS

## 2022-12-28 DIAGNOSIS — L24.5 IRRITANT CONTACT DERMATITIS DUE TO OTHER CHEMICAL PRODUCTS: Primary | ICD-10-CM

## 2022-12-28 PROCEDURE — 99213 OFFICE O/P EST LOW 20 MIN: CPT | Performed by: NURSE PRACTITIONER

## 2022-12-28 PROCEDURE — 96372 THER/PROPH/DIAG INJ SC/IM: CPT | Performed by: NURSE PRACTITIONER

## 2022-12-28 RX ORDER — TRIAMCINOLONE ACETONIDE 40 MG/ML
40 INJECTION, SUSPENSION INTRA-ARTICULAR; INTRAMUSCULAR ONCE
Status: COMPLETED | OUTPATIENT
Start: 2022-12-28 | End: 2022-12-28

## 2022-12-28 RX ORDER — CETIRIZINE HYDROCHLORIDE 10 MG/1
10 TABLET ORAL DAILY
Qty: 30 TABLET | Refills: 0 | Status: SHIPPED | OUTPATIENT
Start: 2022-12-28 | End: 2023-01-27

## 2022-12-28 RX ORDER — PREDNISONE 20 MG/1
40 TABLET ORAL DAILY
Qty: 10 TABLET | Refills: 0 | Status: SHIPPED | OUTPATIENT
Start: 2022-12-28 | End: 2023-01-02

## 2022-12-28 RX ADMIN — TRIAMCINOLONE ACETONIDE 40 MG: 40 INJECTION, SUSPENSION INTRA-ARTICULAR; INTRAMUSCULAR at 16:36

## 2022-12-28 ASSESSMENT — ENCOUNTER SYMPTOMS
SINUS PAIN: 0
RHINORRHEA: 0
WHEEZING: 0
DIFFICULTY BREATHING: 0
HYPERVENTILATION: 0
RECTAL PAIN: 0
NAUSEA: 0
ALLERGIC REACTION: 1
TROUBLE SWALLOWING: 0
STRIDOR: 0
COUGH: 0
SORE THROAT: 0
ABDOMINAL PAIN: 0
EYE REDNESS: 0
BACK PAIN: 0
SINUS PRESSURE: 0
GLOBUS SENSATION: 0
COLOR CHANGE: 0
CHEST TIGHTNESS: 0
SHORTNESS OF BREATH: 0
EYE WATERING: 0
CONSTIPATION: 0
EYE ITCHING: 0
VOMITING: 0
DIARRHEA: 0
ABDOMINAL DISTENTION: 0

## 2022-12-28 NOTE — TELEPHONE ENCOUNTER
----- Message from Sharon Garcia sent at 12/28/2022 12:16 PM EST -----  Subject: Message to Provider    QUESTIONS  Information for Provider? pt would like to  his Vyvanse rx at the   office before he goes out of town on 1/6. Please call the pt when the rx   is ready to .  ---------------------------------------------------------------------------  --------------  [facility]  [phone]; OK to leave message on voicemail  ---------------------------------------------------------------------------  --------------  SCRIPT ANSWERS  Relationship to Patient?  Self

## 2022-12-28 NOTE — PROGRESS NOTES
After obtaining consent, and per orders of José Miguel MASCORRO, injection of Elva Ding was given in Left upper quad. gluteus by Mendel Copper, MA. Patient instructed to remain in clinic for 20 minutes afterwards, and to report any adverse reaction to me immediately. Ztrack tolerated well.

## 2022-12-28 NOTE — PROGRESS NOTES
[address] Encounter      [address] [facility]       Chief Complaint   Patient presents with    Allergic Reaction     Dye his hair in the face and he is having a allergic reaction , swollen, redness  , pus, itching ,start couple days        Nurses Notes reviewed and I agree except as noted in the HPI. HISTORY OF PRESENT ILLNESS   Brayden Leo is a 47 y.o. male who presents   Allergic Reaction  This is a new (dyed beard and hair, started burning, drainage, bleeding reaction) problem. The current episode started 2 days ago. The problem occurs constantly. The problem has been rapidly worsening since onset. The problem is moderate. Associated with: dark brown hair dye. Time of exposure: 2 days ago. The exposure occurred at Home. Associated symptoms include itching and a rash. Pertinent negatives include no abdominal pain, chest pain, chest pressure, coughing, diarrhea, difficulty breathing, drooling, eye itching, eye redness, eye watering, globus sensation, hyperventilation, stridor, trouble swallowing, vomiting or wheezing. Swelling is present on the face (areas of the dye). Treatments tried: moisturizer, vasaline. The treatment provided no relief. There is no history of asthma, atopic dermatitis, food allergies, medication allergies or seasonal allergies. REVIEW OF SYSTEMS     Review of Systems   Constitutional:  Negative for activity change, appetite change, chills, diaphoresis, fatigue and fever. HENT:  Negative for congestion, drooling, postnasal drip, rhinorrhea, sinus pressure, sinus pain, sneezing, sore throat and trouble swallowing. Eyes:  Negative for redness, itching and visual disturbance. Respiratory:  Negative for cough, chest tightness, shortness of breath, wheezing and stridor. Cardiovascular:  Negative for chest pain and palpitations.    Gastrointestinal:  Negative for abdominal distention, abdominal pain, constipation, diarrhea, nausea, rectal pain and vomiting. Genitourinary:  Negative for difficulty urinating, dysuria and flank pain. Musculoskeletal:  Negative for arthralgias, back pain, myalgias, neck pain and neck stiffness. Skin:  Positive for itching and rash. Negative for color change and wound. Allergic/Immunologic: Negative for food allergies. Neurological:  Negative for dizziness, tremors, seizures, syncope, speech difficulty, weakness, light-headedness, numbness and headaches. PAST MEDICAL HISTORY         Diagnosis Date    Anxiety disorder 7/30/2015    Depression 7/30/2015    H/O drug abuse ([facility] [address]     Rehab and detox 4/2013, pain pills    [facility] 7/16/2020       SURGICAL HISTORY     Patient  has a past surgical history that includes Cholecystectomy (6-7-13); other surgical history (Right, 6/27/13); hernia repair (Right, 6/27/13); Hemorrhoid surgery; and Colonoscopy (N/A, 9/3/2021). CURRENT MEDICATIONS       Previous Medications    BUPRENORPHINE-NALOXONE (SUBOXONE) 8-2 MG FILM SL FILM    DISSOLVE 1 (ONE) film UNDER THE TONGUE EVERY DAY    BUPROPION (WELLBUTRIN XL) 150 MG EXTENDED RELEASE TABLET    TAKE 1 TABLET BY MOUTH EVERY DAY IN THE MORNING    FOLIC ACID (FOLVITE) 1 MG TABLET    TAKE 1 TABLET BY MOUTH EVERY DAY    HYDROCHLOROTHIAZIDE (MICROZIDE) 12.5 MG CAPSULE    TAKE 1 CAPSULE BY MOUTH EVERY DAY IN THE MORNING    IBUPROFEN (ADVIL;MOTRIN) 800 MG TABLET    TAKE 1 TABLET BY MOUTH EVERY 6 TO 8 HOURS AS NEEDED FOR PAIN    LISDEXAMFETAMINE (VYVANSE) 70 MG CAPSULE    TAKE 1 CAPSULE BY MOUTH EVERY MORNINGTAKE 1 CAPSULE BY MOUTH EVERY MORNING    NICOTINE (NICODERM CQ) 21 MG/24HR    Place 1 patch onto the skin every 24 hours    SILDENAFIL (VIAGRA) 50 MG TABLET    Take 1 tablet by mouth as needed for Erectile Dysfunction    THIAMINE 50 MG CAPS    Take 100 mg by mouth daily       ALLERGIES     Patientis has No Known Allergies.     FAMILY HISTORY     Patient's family history includes Cancer in his father and mother; Colon Cancer in his father; Depression in his mother; High Cholesterol in his father. SOCIAL HISTORY     Patient  reports that he has been smoking cigarettes. He has a 45.00 pack-year smoking history. He uses smokeless tobacco. He reports that he does not currently use alcohol. He reports current drug use. Drug: Marijuana Brennan Radon). PHYSICAL EXAM     ED TRIAGE VITALS  BP: 120/82, Temp: 97.2 °F (36.2 °C), Heart Rate: 89,  , SpO2: 97 %  Physical Exam  Constitutional:       General: He is not in acute distress. Appearance: Normal appearance. He is normal weight. He is not ill-appearing, toxic-appearing or diaphoretic. HENT:      Head: Normocephalic and atraumatic. Right Ear: External ear normal.      Left Ear: External ear normal.      Nose: Nose normal. No congestion or rhinorrhea. Mouth/Throat:      Mouth: Mucous membranes are moist.      Pharynx: Oropharynx is clear. No oropharyngeal exudate or posterior oropharyngeal erythema. Eyes:      General:         Right eye: No discharge. Left eye: No discharge. Conjunctiva/sclera: Conjunctivae normal.      Pupils: Pupils are equal, round, and reactive to light. Cardiovascular:      Rate and Rhythm: Normal rate and regular rhythm. Pulses: Normal pulses. Pulmonary:      Effort: Pulmonary effort is normal.      Breath sounds: Normal breath sounds. Musculoskeletal:         General: No swelling, tenderness or signs of injury. Normal range of motion. Cervical back: Normal range of motion and neck supple. No rigidity or tenderness. Skin:     General: Skin is warm and dry. Capillary Refill: Capillary refill takes less than 2 seconds. Findings: Rash present. Rash is crusting, pustular and scaling. Neurological:      General: No focal deficit present. Mental Status: He is alert and oriented to person, place, and time. Mental status is at baseline. Cranial Nerves: No cranial nerve deficit.       Sensory: No sensory deficit. Motor: No weakness. Coordination: Coordination normal.       DIAGNOSTICRESULTS   Labs:       IMAGING:    URGENT CARE COURSE:     Vitals:    12/28/22 1558   BP: 120/82   Pulse: 89   Temp: 97.2 °F (36.2 °C)   SpO2: 97%   Weight: 162 lb (73.5 kg)   Height: 5' 10\" (1.778 m)         PROCEDURES:  None  FINAL IMPRESSION      1. Irritant contact dermatitis due to other chemical products        DISPOSITION/PLAN   DISPOSITION      PATIENT REFERRED TO:  Return in about 1 week (around 1/4/2023). DISCHARGE MEDICATIONS:  New Prescriptions    CETIRIZINE (ZYRTEC) 10 MG TABLET    Take 1 tablet by mouth daily    HYDROCORTISONE 2.5 % CREAM    Apply topically 2 times daily. PREDNISONE (DELTASONE) 20 MG TABLET    Take 2 tablets by mouth daily for 5 days     Cannot display discharge medications since this is not an admission.       SUBHA Avendano - CNP

## 2023-01-19 DIAGNOSIS — L24.5 IRRITANT CONTACT DERMATITIS DUE TO OTHER CHEMICAL PRODUCTS: ICD-10-CM

## 2023-01-19 RX ORDER — CETIRIZINE HYDROCHLORIDE 10 MG/1
TABLET ORAL
Qty: 30 TABLET | Refills: 0 | OUTPATIENT
Start: 2023-01-19

## 2023-02-01 ENCOUNTER — TELEPHONE (OUTPATIENT)
Dept: FAMILY MEDICINE CLINIC | Age: 53
End: 2023-02-01

## 2023-02-01 NOTE — TELEPHONE ENCOUNTER
A script transfer can be done. They are sent to local pharmacy and then he can request a transfer to the local pharmacy where he is at.

## 2023-02-01 NOTE — TELEPHONE ENCOUNTER
Patient states he will be going to [place] and will return on/around July 31. He would like to know if his refills can or will be sent to a pharmacy in [place] while he is there.     Please advise    Jumana Lowry [phone]

## 2023-02-02 DIAGNOSIS — F90.9 ADULT ADHD: ICD-10-CM

## 2023-02-03 ENCOUNTER — TELEPHONE (OUTPATIENT)
Dept: FAMILY MEDICINE CLINIC | Age: 53
End: 2023-02-03

## 2023-02-03 NOTE — TELEPHONE ENCOUNTER
----- Message from Vinita Milan sent at 2/3/2023  2:15 PM EST -----  Subject: Message to Provider    QUESTIONS  Information for Provider? Patient is calling because he moved to [place]   and needs his VYVANSE sent there [facility] called [facility] to get [facility] but   they said he needs a new prescription sent to [place] please refill and   call to assist [facility] [place] [phone]   [address] [phone]   ---------------------------------------------------------------------------  --------------  Dorian Jordan INFO  [phone]; OK to leave message on voicemail  ---------------------------------------------------------------------------  --------------  SCRIPT ANSWERS  Relationship to Patient? Other  Representative Name? Elizabeth Gonzalez  Is the Representative on the appropriate HIPAA document in Epic?  Yes

## 2023-02-06 ENCOUNTER — TELEPHONE (OUTPATIENT)
Dept: FAMILY MEDICINE CLINIC | Age: 53
End: 2023-02-06

## 2023-02-06 NOTE — TELEPHONE ENCOUNTER
Patient states [facility] cannot transfer Vyvanse because it is controlled.  Patient is now in [facility] and would like a new rx to be sent to     [facility]  [address]    Phone [phone] when sent [phone]

## 2023-02-07 NOTE — TELEPHONE ENCOUNTER
Patient is now aware the medication cannot be transferred and [facility] is not licensed to send medication in [facility] patient will need to find  a new PCP or have someone mail his medication

## 2023-03-09 DIAGNOSIS — F90.9 ADULT ADHD: ICD-10-CM

## 2023-04-27 DIAGNOSIS — E53.8 FOLATE DEFICIENCY: ICD-10-CM

## 2023-04-27 DIAGNOSIS — F90.9 ADULT ADHD: ICD-10-CM

## 2023-04-27 DIAGNOSIS — R60.0 LOWER EXTREMITY EDEMA: ICD-10-CM

## 2023-04-27 RX ORDER — FOLIC ACID 1 MG/1
1000 TABLET ORAL DAILY
Qty: 30 TABLET | Refills: 8 | Status: SHIPPED | OUTPATIENT
Start: 2023-04-27

## 2023-04-27 RX ORDER — HYDROCHLOROTHIAZIDE 12.5 MG/1
CAPSULE, GELATIN COATED ORAL
Qty: 30 CAPSULE | Refills: 5 | Status: SHIPPED | OUTPATIENT
Start: 2023-04-27

## 2024-01-23 ENCOUNTER — OFFICE VISIT (OUTPATIENT)
Dept: FAMILY MEDICINE CLINIC | Age: 54
End: 2024-01-23
Payer: COMMERCIAL

## 2024-01-23 VITALS
HEART RATE: 88 BPM | DIASTOLIC BLOOD PRESSURE: 82 MMHG | HEIGHT: 71 IN | WEIGHT: 211 LBS | OXYGEN SATURATION: 97 % | RESPIRATION RATE: 16 BRPM | BODY MASS INDEX: 29.54 KG/M2 | SYSTOLIC BLOOD PRESSURE: 120 MMHG

## 2024-01-23 DIAGNOSIS — Z13.1 DIABETES MELLITUS SCREENING: ICD-10-CM

## 2024-01-23 DIAGNOSIS — Z87.891 PERSONAL HISTORY OF TOBACCO USE: ICD-10-CM

## 2024-01-23 DIAGNOSIS — E51.9 THIAMINE DEFICIENCY: ICD-10-CM

## 2024-01-23 DIAGNOSIS — E53.8 FOLATE DEFICIENCY: ICD-10-CM

## 2024-01-23 DIAGNOSIS — Z72.0 TOBACCO ABUSE: ICD-10-CM

## 2024-01-23 DIAGNOSIS — N63.13 MASS OF LOWER OUTER QUADRANT OF RIGHT BREAST: ICD-10-CM

## 2024-01-23 DIAGNOSIS — E78.2 MIXED HYPERLIPIDEMIA: ICD-10-CM

## 2024-01-23 DIAGNOSIS — R19.05 PERIUMBILICAL MASS: Primary | ICD-10-CM

## 2024-01-23 PROCEDURE — G0296 VISIT TO DETERM LDCT ELIG: HCPCS | Performed by: PHYSICIAN ASSISTANT

## 2024-01-23 PROCEDURE — 99215 OFFICE O/P EST HI 40 MIN: CPT | Performed by: PHYSICIAN ASSISTANT

## 2024-01-23 RX ORDER — VARENICLINE TARTRATE 1 MG/1
1 TABLET, FILM COATED ORAL 2 TIMES DAILY
Qty: 60 TABLET | Refills: 3 | Status: SHIPPED | OUTPATIENT
Start: 2024-01-23

## 2024-01-23 RX ORDER — VARENICLINE TARTRATE 0.5 (11)-1
KIT ORAL
Qty: 53 EACH | Refills: 0 | Status: SHIPPED | OUTPATIENT
Start: 2024-01-23

## 2024-01-23 SDOH — ECONOMIC STABILITY: HOUSING INSECURITY
IN THE LAST 12 MONTHS, WAS THERE A TIME WHEN YOU DID NOT HAVE A STEADY PLACE TO SLEEP OR SLEPT IN A SHELTER (INCLUDING NOW)?: NO

## 2024-01-23 SDOH — ECONOMIC STABILITY: INCOME INSECURITY: HOW HARD IS IT FOR YOU TO PAY FOR THE VERY BASICS LIKE FOOD, HOUSING, MEDICAL CARE, AND HEATING?: NOT HARD AT ALL

## 2024-01-23 SDOH — ECONOMIC STABILITY: FOOD INSECURITY: WITHIN THE PAST 12 MONTHS, YOU WORRIED THAT YOUR FOOD WOULD RUN OUT BEFORE YOU GOT MONEY TO BUY MORE.: NEVER TRUE

## 2024-01-23 SDOH — ECONOMIC STABILITY: FOOD INSECURITY: WITHIN THE PAST 12 MONTHS, THE FOOD YOU BOUGHT JUST DIDN'T LAST AND YOU DIDN'T HAVE MONEY TO GET MORE.: NEVER TRUE

## 2024-01-23 ASSESSMENT — ENCOUNTER SYMPTOMS
COLOR CHANGE: 0
COUGH: 0
TROUBLE SWALLOWING: 0
ABDOMINAL PAIN: 0
CHEST TIGHTNESS: 0
ABDOMINAL DISTENTION: 0
WHEEZING: 0

## 2024-01-23 ASSESSMENT — PATIENT HEALTH QUESTIONNAIRE - PHQ9
SUM OF ALL RESPONSES TO PHQ QUESTIONS 1-9: 0
SUM OF ALL RESPONSES TO PHQ QUESTIONS 1-9: 0
2. FEELING DOWN, DEPRESSED OR HOPELESS: 0
SUM OF ALL RESPONSES TO PHQ QUESTIONS 1-9: 0
SUM OF ALL RESPONSES TO PHQ QUESTIONS 1-9: 0
1. LITTLE INTEREST OR PLEASURE IN DOING THINGS: 0
SUM OF ALL RESPONSES TO PHQ9 QUESTIONS 1 & 2: 0

## 2024-01-23 NOTE — PATIENT INSTRUCTIONS
follow-up, he or she will help you understand what to do next.  After a lung cancer screening, you can go back to your usual activities right away.  A lung cancer screening test can't tell if you have lung cancer. If your results are positive, your doctor can't tell whether an abnormal finding is a harmless nodule, cancer, or something else without doing more tests.  What can you do to help prevent lung cancer?  Some lung cancers can't be prevented. But if you smoke, quitting smoking is the best step you can take to prevent lung cancer. If you want to quit, your doctor can recommend medicines or other ways to help.  Follow-up care is a key part of your treatment and safety. Be sure to make and go to all appointments, and call your doctor if you are having problems. It's also a good idea to know your test results and keep a list of the medicines you take.  Where can you learn more?  Go to https://www.[employer].net/patientEd and enter Q940 to learn more about \"Learning About Lung Cancer Screening.\"  Current as of: February 28, 2023               Content Version: 13.9  © 8914-5260 [employer].   Care instructions adapted under license by [employer]. If you have questions about a medical condition or this instruction, always ask your healthcare professional. [employer] disclaims any warranty or liability for your use of this information.

## 2024-01-23 NOTE — PROGRESS NOTES
Discussed with the patient the current USPSTF guidelines released March 9, 2021 for screening for lung cancer.    For adults aged 50 to 80 years who have a 20 pack-year smoking history and currently smoke or have quit within the past 15 years the grade B recommendation is to:  Screen for lung cancer with low-dose computed tomography (LDCT) every year.  Stop screening once a person has not smoked for 15 years or has a health problem that limits life expectancy or the ability to have lung surgery.    The patient  reports that he has been smoking cigarettes. He has a 45.0 pack-year smoking history. He uses smokeless tobacco.. Discussed with patient the risks and benefits of screening, including over-diagnosis, false positive rate, and total radiation exposure.  The patient currently exhibits no signs or symptoms suggestive of lung cancer.  Discussed with patient the importance of compliance with yearly annual lung cancer screenings and willingness to undergo diagnosis and treatment if screening scan is positive.  In addition, the patient was counseled regarding the importance of remaining smoke free and/or total smoking cessation.    Also reviewed the following if the patient has Medicare that as of February 10, 2022, Medicare only covers LDCT screening in patients aged 50-77 with at least a 20 pack-year smoking history who currently smoke or have quit in the last 15 years  
of the medication prescribed today, as well as treatment plan/ rationale and result expectations have been discussed with the patient who expresses understanding and desires to proceed.    Close follow up to evaluate treatment results and for coordination of care.  I have reviewed the patient's medical history in detail and updated the computerized patient record.    Pati Mustafa PA-C

## 2024-01-24 PROBLEM — R20.2 NUMBNESS AND TINGLING OF BOTH FEET: Status: RESOLVED | Noted: 2021-04-15 | Resolved: 2024-01-24

## 2024-01-24 PROBLEM — K59.09 CHRONIC CONSTIPATION: Status: RESOLVED | Noted: 2021-07-31 | Resolved: 2024-01-24

## 2024-01-24 PROBLEM — R19.05 PERIUMBILICAL MASS: Status: ACTIVE | Noted: 2024-01-24

## 2024-01-24 PROBLEM — B35.1 ONYCHOMYCOSIS OF TOENAIL: Status: RESOLVED | Noted: 2021-04-15 | Resolved: 2024-01-24

## 2024-01-24 PROBLEM — N63.13 MASS OF LOWER OUTER QUADRANT OF RIGHT BREAST: Status: ACTIVE | Noted: 2024-01-24

## 2024-01-24 PROBLEM — B35.3 TINEA PEDIS OF BOTH FEET: Status: RESOLVED | Noted: 2021-04-15 | Resolved: 2024-01-24

## 2024-01-24 PROBLEM — N52.9 ERECTILE DYSFUNCTION: Status: RESOLVED | Noted: 2022-08-15 | Resolved: 2024-01-24

## 2024-01-24 PROBLEM — R60.0 LOWER EXTREMITY EDEMA: Status: RESOLVED | Noted: 2021-04-15 | Resolved: 2024-01-24

## 2024-01-24 PROBLEM — R20.0 NUMBNESS AND TINGLING OF BOTH FEET: Status: RESOLVED | Noted: 2021-04-15 | Resolved: 2024-01-24

## 2024-02-03 DIAGNOSIS — Z13.1 DIABETES MELLITUS SCREENING: ICD-10-CM

## 2024-02-03 DIAGNOSIS — E53.8 FOLATE DEFICIENCY: ICD-10-CM

## 2024-02-03 DIAGNOSIS — E78.2 MIXED HYPERLIPIDEMIA: ICD-10-CM

## 2024-02-03 DIAGNOSIS — E51.9 THIAMINE DEFICIENCY: ICD-10-CM

## 2024-02-03 LAB
ALBUMIN SERPL-MCNC: 4.1 G/DL (ref 3.5–4.6)
ALP SERPL-CCNC: 98 U/L (ref 35–104)
ALT SERPL-CCNC: 22 U/L (ref 0–41)
ANION GAP SERPL CALCULATED.3IONS-SCNC: 12 MEQ/L (ref 9–15)
AST SERPL-CCNC: 15 U/L (ref 0–40)
BILIRUB SERPL-MCNC: 0.3 MG/DL (ref 0.2–0.7)
BUN SERPL-MCNC: 9 MG/DL (ref 6–20)
CALCIUM SERPL-MCNC: 8.8 MG/DL (ref 8.5–9.9)
CHLORIDE SERPL-SCNC: 99 MEQ/L (ref 95–107)
CHOLEST SERPL-MCNC: 189 MG/DL (ref 0–199)
CO2 SERPL-SCNC: 30 MEQ/L (ref 20–31)
CREAT SERPL-MCNC: 0.77 MG/DL (ref 0.7–1.2)
ERYTHROCYTE [DISTWIDTH] IN BLOOD BY AUTOMATED COUNT: 12.8 % (ref 11.5–14.5)
GLOBULIN SER CALC-MCNC: 2.5 G/DL (ref 2.3–3.5)
GLUCOSE SERPL-MCNC: 108 MG/DL (ref 70–99)
HBA1C MFR BLD: 5.7 % (ref 4.8–5.9)
HCT VFR BLD AUTO: 45.6 % (ref 42–52)
HDLC SERPL-MCNC: 36 MG/DL (ref 40–59)
HGB BLD-MCNC: 15 G/DL (ref 14–18)
LDL CHOLESTEROL CALCULATED: 138 MG/DL (ref 0–129)
MCH RBC QN AUTO: 29.2 PG (ref 27–31.3)
MCHC RBC AUTO-ENTMCNC: 32.9 % (ref 33–37)
MCV RBC AUTO: 88.9 FL (ref 79–92.2)
PLATELET # BLD AUTO: 170 K/UL (ref 130–400)
POTASSIUM SERPL-SCNC: 3.8 MEQ/L (ref 3.4–4.9)
PROT SERPL-MCNC: 6.6 G/DL (ref 6.3–8)
RBC # BLD AUTO: 5.13 M/UL (ref 4.7–6.1)
SODIUM SERPL-SCNC: 141 MEQ/L (ref 135–144)
TRIGLYCERIDE, FASTING: 76 MG/DL (ref 0–150)
WBC # BLD AUTO: 7.8 K/UL (ref 4.8–10.8)

## 2024-02-04 LAB
FOLATE: 6 NG/ML
VITAMIN B-12: 485 PG/ML (ref 232–1245)

## 2024-02-09 ENCOUNTER — HOSPITAL ENCOUNTER (OUTPATIENT)
Dept: CT IMAGING | Age: 54
Discharge: HOME | End: 2024-02-09
Payer: COMMERCIAL

## 2024-02-09 ENCOUNTER — HOSPITAL ENCOUNTER (OUTPATIENT)
Dept: CT IMAGING | Age: 54
End: 2024-02-09
Payer: COMMERCIAL

## 2024-02-09 DIAGNOSIS — Z87.891 PERSONAL HISTORY OF TOBACCO USE: ICD-10-CM

## 2024-02-09 DIAGNOSIS — R19.05 PERIUMBILICAL MASS: ICD-10-CM

## 2024-02-09 PROCEDURE — 74176 CT ABD & PELVIS W/O CONTRAST: CPT

## 2024-02-09 PROCEDURE — 71271 CT THORAX LUNG CANCER SCR C-: CPT

## 2024-02-10 LAB — VIT B1 SERPL-MCNC: 3 NMOL/L (ref 4–15)

## 2024-02-12 DIAGNOSIS — N63.13 MASS OF LOWER OUTER QUADRANT OF RIGHT BREAST: Primary | ICD-10-CM

## 2024-02-12 RX ORDER — LANOLIN ALCOHOL/MO/W.PET/CERES
100 CREAM (GRAM) TOPICAL DAILY
Qty: 90 TABLET | Refills: 4 | Status: SHIPPED | OUTPATIENT
Start: 2024-02-12

## 2024-02-13 ENCOUNTER — HOSPITAL ENCOUNTER (OUTPATIENT)
Dept: ULTRASOUND IMAGING | Age: 54
Discharge: HOME | End: 2024-02-15
Payer: COMMERCIAL

## 2024-02-13 ENCOUNTER — HOSPITAL ENCOUNTER (OUTPATIENT)
Dept: WOMENS IMAGING | Age: 54
Discharge: HOME | End: 2024-02-15
Payer: COMMERCIAL

## 2024-02-13 VITALS — WEIGHT: 215 LBS | BODY MASS INDEX: 29.99 KG/M2

## 2024-02-13 DIAGNOSIS — N63.13 MASS OF LOWER OUTER QUADRANT OF RIGHT BREAST: ICD-10-CM

## 2024-02-13 DIAGNOSIS — N63.0 LUMP IN FEMALE BREAST: ICD-10-CM

## 2024-02-13 PROCEDURE — 76642 ULTRASOUND BREAST LIMITED: CPT

## 2024-02-13 PROCEDURE — G0279 TOMOSYNTHESIS, MAMMO: HCPCS

## 2024-02-19 ENCOUNTER — OFFICE VISIT (OUTPATIENT)
Dept: SURGERY | Age: 54
End: 2024-02-19
Payer: COMMERCIAL

## 2024-02-19 VITALS
DIASTOLIC BLOOD PRESSURE: 78 MMHG | SYSTOLIC BLOOD PRESSURE: 130 MMHG | WEIGHT: 204.6 LBS | OXYGEN SATURATION: 98 % | TEMPERATURE: 97.1 F | HEART RATE: 97 BPM | BODY MASS INDEX: 28.64 KG/M2 | HEIGHT: 71 IN

## 2024-02-19 DIAGNOSIS — K43.9 VENTRAL HERNIA WITHOUT OBSTRUCTION OR GANGRENE: Primary | ICD-10-CM

## 2024-02-19 PROCEDURE — 99203 OFFICE O/P NEW LOW 30 MIN: CPT | Performed by: SURGERY

## 2024-02-19 NOTE — PROGRESS NOTES
GENERAL SURGERY CLINIC NOTE    Pt Name: Alejandro Talley  MRN: [id number]  Date: 2/19/2024        SUBJECTIVE:     History of Chief Complaint:    Alejandro is a 53 y.o. male who presents with ventral hernia. He  is stating that he realized that he had a hernia couple months ago and he is willing this hernia to be fixed.      Past Medical History:   Diagnosis Date    Anxiety disorder 07/30/2015    Depression 07/30/2015    H/O drug abuse (HCC)     Rehab and detox 4/2013, pain pills    Substance abuse (HCC) 07/16/2020     Past Surgical History:   Procedure Laterality Date    CHOLECYSTECTOMY  06/07/2013    lapchole with gram    COLONOSCOPY N/A 09/03/2021    COLORECTAL CANCER SCREENING, HIGH RISK performed by George Rao MD at [facility]    HEMORRHOID SURGERY      HERNIA REPAIR Right 06/27/2013    RIH    OTHER SURGICAL HISTORY Right 06/27/2013    Diagnostic Laparoscopy, Repair of RIH with medium light perfix plus system     Prior to Admission medications    Medication Sig Start Date End Date Taking? Authorizing Provider   thiamine 100 MG tablet Take 1 tablet by mouth daily 2/12/24  Yes Pati Mustafa PA-C   Varenicline Tartrate, Starter, (CHANTIX STARTING MONTH PAK) 0.5 MG X 11 & 1 MG X 42 TBPK Take per instructions.  Take with food.  Second dose with dinner. 1/23/24  Yes Pati Mustafa PA-C   varenicline (CHANTIX CONTINUING MONTH PAK) 1 MG tablet Take 1 tablet by mouth 2 times daily 1/23/24  Yes Pati Mustafa PA-C   buprenorphine-naloxone (SUBOXONE) 8-2 MG FILM SL film DISSOLVE 1 (ONE) film UNDER THE TONGUE EVERY DAY 5/18/21  Yes Provider, MD Bart     No Known Allergies  Family History   Problem Relation Age of Onset    Breast Cancer Mother 50    Cancer Mother     Depression Mother     Cancer Father         Colon    High Cholesterol Father     Colon Cancer Father      Social History     Tobacco Use    Smoking status: Every Day     Current packs/day: 1.50     Average packs/day: 1.5 packs/day for 30.0 years

## 2024-03-05 ENCOUNTER — OFFICE VISIT (OUTPATIENT)
Dept: FAMILY MEDICINE CLINIC | Age: 54
End: 2024-03-05
Payer: COMMERCIAL

## 2024-03-05 VITALS
DIASTOLIC BLOOD PRESSURE: 88 MMHG | HEART RATE: 76 BPM | SYSTOLIC BLOOD PRESSURE: 120 MMHG | WEIGHT: 199.96 LBS | OXYGEN SATURATION: 98 % | HEIGHT: 71 IN | RESPIRATION RATE: 16 BRPM | BODY MASS INDEX: 27.99 KG/M2

## 2024-03-05 DIAGNOSIS — Z72.0 TOBACCO ABUSE: Primary | ICD-10-CM

## 2024-03-05 DIAGNOSIS — E78.2 MIXED HYPERLIPIDEMIA: ICD-10-CM

## 2024-03-05 DIAGNOSIS — K43.9 VENTRAL HERNIA WITHOUT OBSTRUCTION OR GANGRENE: ICD-10-CM

## 2024-03-05 DIAGNOSIS — E51.9 THIAMINE DEFICIENCY: ICD-10-CM

## 2024-03-05 PROCEDURE — 99214 OFFICE O/P EST MOD 30 MIN: CPT | Performed by: PHYSICIAN ASSISTANT

## 2024-03-05 RX ORDER — ONDANSETRON 4 MG/1
4 TABLET, ORALLY DISINTEGRATING ORAL 3 TIMES DAILY PRN
Qty: 21 TABLET | Refills: 2 | Status: SHIPPED | OUTPATIENT
Start: 2024-03-05

## 2024-03-05 NOTE — PROGRESS NOTES
Subjective  Alejandro Talley, 53 y.o. male presents today with:  Chief Complaint   Patient presents with    Follow-up     6 week follow up states he wouldn't be able to get into have surgery for another month but does not think he can wait this long had vomiting all weekend    Other       HPI  In the office today for follow up.  Last OV With me: 1/23/24  Wife, Haley, with patient.     Had labs and imaging completed since last OV.   Mammogram normal.   Did not start thiamine supplement yet.     CT scan of abdomen/pelvis confirms ventral hernia.  He has been having worsening abdominal pain with generalized discomfort.    Did have nausea with emesis this weekend; patient is feeling better.   Needs to schedule repair.  Denies intractable abdominal pain.    Red meat and greasy/fatty foods seem to provoke.     He has not started chantix yet for smoking cessation. He is very worried about nausea.      Results for orders placed or performed in visit on 02/03/24   Hemoglobin A1C   Result Value Ref Range    Hemoglobin A1C 5.7 4.8 - 5.9 %   Lipid, Fasting   Result Value Ref Range    Cholesterol, Fasting 189 0 - 199 mg/dL    Triglyceride, Fasting 76 0 - 150 mg/dL    HDL 36 (L) 40 - 59 mg/dL    LDL Calculated 138 (H) 0 - 129 mg/dL   Vitamin B12 & Folate   Result Value Ref Range    Vitamin B-12 485 232 - 1245 pg/mL    Folate 6.0 >4.8 ng/mL   Comprehensive Metabolic Panel   Result Value Ref Range    Sodium 141 135 - 144 mEq/L    Potassium 3.8 3.4 - 4.9 mEq/L    Chloride 99 95 - 107 mEq/L    CO2 30 20 - 31 mEq/L    Anion Gap 12 9 - 15 mEq/L    Glucose 108 (H) 70 - 99 mg/dL    BUN 9 6 - 20 mg/dL    Creatinine 0.77 0.70 - 1.20 mg/dL    Est, Glom Filt Rate >60.0 >60    Calcium 8.8 8.5 - 9.9 mg/dL    Total Protein 6.6 6.3 - 8.0 g/dL    Albumin 4.1 3.5 - 4.6 g/dL    Total Bilirubin 0.3 0.2 - 0.7 mg/dL    Alkaline Phosphatase 98 35 - 104 U/L    ALT 22 0 - 41 U/L    AST 15 0 - 40 U/L    Globulin 2.5 2.3 - 3.5 g/dL   CBC   Result Value

## 2024-03-06 ENCOUNTER — PREP FOR PROCEDURE (OUTPATIENT)
Dept: SURGERY | Age: 54
End: 2024-03-06

## 2024-03-06 DIAGNOSIS — K43.9 VENTRAL HERNIA WITHOUT OBSTRUCTION OR GANGRENE: ICD-10-CM

## 2024-03-06 PROBLEM — R19.05 PERIUMBILICAL MASS: Status: RESOLVED | Noted: 2024-01-24 | Resolved: 2024-03-06

## 2024-03-06 PROBLEM — N63.13 MASS OF LOWER OUTER QUADRANT OF RIGHT BREAST: Status: RESOLVED | Noted: 2024-01-24 | Resolved: 2024-03-06

## 2024-03-06 ASSESSMENT — ENCOUNTER SYMPTOMS
COLOR CHANGE: 0
ABDOMINAL PAIN: 0
CHEST TIGHTNESS: 0
SHORTNESS OF BREATH: 0
WHEEZING: 0
ABDOMINAL DISTENTION: 0
COUGH: 0
TROUBLE SWALLOWING: 0
VOICE CHANGE: 0

## 2024-03-20 RX ORDER — SODIUM CHLORIDE 0.9 % (FLUSH) 0.9 %
5-40 SYRINGE (ML) INJECTION EVERY 12 HOURS SCHEDULED
Status: CANCELLED | OUTPATIENT
Start: 2024-03-20

## 2024-03-20 RX ORDER — SODIUM CHLORIDE 0.9 % (FLUSH) 0.9 %
5-40 SYRINGE (ML) INJECTION PRN
Status: CANCELLED | OUTPATIENT
Start: 2024-03-20

## 2024-03-20 RX ORDER — SODIUM CHLORIDE 9 MG/ML
INJECTION, SOLUTION INTRAVENOUS PRN
Status: CANCELLED | OUTPATIENT
Start: 2024-03-20

## 2024-03-29 ENCOUNTER — HOSPITAL ENCOUNTER (OUTPATIENT)
Dept: PREADMISSION TESTING | Age: 54
Discharge: HOME | End: 2024-04-02
Payer: COMMERCIAL

## 2024-03-29 VITALS
HEART RATE: 76 BPM | TEMPERATURE: 99.3 F | SYSTOLIC BLOOD PRESSURE: 131 MMHG | OXYGEN SATURATION: 98 % | WEIGHT: 205.4 LBS | RESPIRATION RATE: 14 BRPM | DIASTOLIC BLOOD PRESSURE: 72 MMHG | BODY MASS INDEX: 29.41 KG/M2 | HEIGHT: 70 IN

## 2024-03-29 PROCEDURE — 86850 RBC ANTIBODY SCREEN: CPT

## 2024-03-29 PROCEDURE — 86900 BLOOD TYPING SEROLOGIC ABO: CPT

## 2024-03-29 PROCEDURE — 86901 BLOOD TYPING SEROLOGIC RH(D): CPT

## 2024-03-29 NOTE — H&P
PRE ADMISSION TESTING    HISTORY AND PHYSICAL EXAM    PATIENT NAME:  Alejandro Talley    YOB: 1970  MRN:  [id number]  SERVICE DATE:  3/29/2024     Primary Care Physician: Pati Mustafa PA-C   Surgeon: Dr. Dwayne Jamison    SUBJECTIVE  CHIEF COMPLAINT:  Ventral hernia without obstruction or gangrene     The patient is a 53 y.o. male with significant past medical history of ADHD, HLD, mood disorder, h/o substance abuse-opioids (2013) currently on Suboxone who presents for a preoperative consultation at the request of surgeon, Dr. Dwayne Jamison, who plans on performing VENTRAL HERNIA REPAIR WITH MESH on 4/5/24 at [facility].     Patient reports in September 2023 he noticed a bulge in his stomach and was concerned.  In January 2024, he had a annual exam with his PCP and he reported the area to her.  She referred him to Dr. Jamison for a hernia consult.  Patient reports that with any pressure or pushing on it will cause some pain in the hernia region.  As time progresses the area is becoming more sensitive to touch.  If he coughs, he will have pain but it does go away shortly after it begins.  He also reports that when he wakes in the morning, he is nauseous.  He has been taking Zofran which does help.  Dr. Jamison recommended repair of the ventral hernia and the patient has elected to proceed.    Patient reports prior history of substance abuse (Percocet, Vicodin, etc) but has been sober since 2013.  He does take Suboxone daily for his addiction.     Patient is current every day smoker 1.5 ppd    Known Anesthesia Problems: None  Patient Objection to Receiving Blood Products: No  Personal or FH of DVT/PE: No    Medical/Cardiac Clearance Needed: Not Required    ALLERGIES: Patient has no known allergies.    PAST MEDICAL HISTORY:    Past Medical History:   Diagnosis Date    Anxiety disorder 07/30/2015    Depression 07/30/2015    H/O drug abuse (HCC)     Rehab and detox 4/2013, pain pills    Hyperlipidemia     Substance

## 2024-03-30 LAB
ABO + RH BLD: NORMAL
BLD GP AB SCN SERPL QL: NORMAL

## 2024-04-04 ENCOUNTER — ANESTHESIA EVENT (OUTPATIENT)
Dept: OPERATING ROOM | Age: 54
End: 2024-04-04
Payer: COMMERCIAL

## 2024-04-05 ENCOUNTER — ANESTHESIA (OUTPATIENT)
Dept: OPERATING ROOM | Age: 54
End: 2024-04-05
Payer: COMMERCIAL

## 2024-04-05 ENCOUNTER — HOSPITAL ENCOUNTER (OUTPATIENT)
Age: 54
Setting detail: OUTPATIENT SURGERY
Discharge: HOME | End: 2024-04-05
Attending: SURGERY | Admitting: SURGERY
Payer: COMMERCIAL

## 2024-04-05 VITALS
WEIGHT: 205 LBS | TEMPERATURE: 97.2 F | BODY MASS INDEX: 29.35 KG/M2 | SYSTOLIC BLOOD PRESSURE: 157 MMHG | HEART RATE: 92 BPM | OXYGEN SATURATION: 98 % | HEIGHT: 70 IN | DIASTOLIC BLOOD PRESSURE: 89 MMHG | RESPIRATION RATE: 17 BRPM

## 2024-04-05 DIAGNOSIS — K43.9 VENTRAL HERNIA WITHOUT OBSTRUCTION OR GANGRENE: Primary | ICD-10-CM

## 2024-04-05 PROCEDURE — 6360000002 HC RX W HCPCS: Performed by: STUDENT IN AN ORGANIZED HEALTH CARE EDUCATION/TRAINING PROGRAM

## 2024-04-05 PROCEDURE — 2580000003 HC RX 258: Performed by: SURGERY

## 2024-04-05 PROCEDURE — 3600000009 HC SURGERY ROBOT BASE: Performed by: SURGERY

## 2024-04-05 PROCEDURE — 3700000000 HC ANESTHESIA ATTENDED CARE: Performed by: SURGERY

## 2024-04-05 PROCEDURE — 6360000002 HC RX W HCPCS

## 2024-04-05 PROCEDURE — 3600000019 HC SURGERY ROBOT ADDTL 15MIN: Performed by: SURGERY

## 2024-04-05 PROCEDURE — A4217 STERILE WATER/SALINE, 500 ML: HCPCS | Performed by: SURGERY

## 2024-04-05 PROCEDURE — 3700000001 HC ADD 15 MINUTES (ANESTHESIA): Performed by: SURGERY

## 2024-04-05 PROCEDURE — 7100000011 HC PHASE II RECOVERY - ADDTL 15 MIN: Performed by: SURGERY

## 2024-04-05 PROCEDURE — 7100000001 HC PACU RECOVERY - ADDTL 15 MIN: Performed by: SURGERY

## 2024-04-05 PROCEDURE — 2500000003 HC RX 250 WO HCPCS

## 2024-04-05 PROCEDURE — 2580000003 HC RX 258: Performed by: STUDENT IN AN ORGANIZED HEALTH CARE EDUCATION/TRAINING PROGRAM

## 2024-04-05 PROCEDURE — 7100000000 HC PACU RECOVERY - FIRST 15 MIN: Performed by: SURGERY

## 2024-04-05 PROCEDURE — 49593 RPR AA HRN 1ST 3-10 RDC: CPT | Performed by: SURGERY

## 2024-04-05 PROCEDURE — 2709999900 HC NON-CHARGEABLE SUPPLY: Performed by: SURGERY

## 2024-04-05 PROCEDURE — 64488 TAP BLOCK BI INJECTION: CPT | Performed by: STUDENT IN AN ORGANIZED HEALTH CARE EDUCATION/TRAINING PROGRAM

## 2024-04-05 PROCEDURE — 7100000010 HC PHASE II RECOVERY - FIRST 15 MIN: Performed by: SURGERY

## 2024-04-05 PROCEDURE — 6360000002 HC RX W HCPCS: Performed by: SURGERY

## 2024-04-05 PROCEDURE — C1781 MESH (IMPLANTABLE): HCPCS | Performed by: SURGERY

## 2024-04-05 PROCEDURE — S2900 ROBOTIC SURGICAL SYSTEM: HCPCS | Performed by: SURGERY

## 2024-04-05 DEVICE — MESH SURG DIA4.5IN CIR W/ ECHO 2 POS SYS VENTRALIGHT: Type: IMPLANTABLE DEVICE | Site: UMBILICAL | Status: FUNCTIONAL

## 2024-04-05 RX ORDER — DIPHENHYDRAMINE HYDROCHLORIDE 50 MG/ML
12.5 INJECTION INTRAMUSCULAR; INTRAVENOUS
Status: DISCONTINUED | OUTPATIENT
Start: 2024-04-05 | End: 2024-04-05 | Stop reason: HOSPADM

## 2024-04-05 RX ORDER — PROPOFOL 10 MG/ML
INJECTION, EMULSION INTRAVENOUS PRN
Status: DISCONTINUED | OUTPATIENT
Start: 2024-04-05 | End: 2024-04-05 | Stop reason: SDUPTHER

## 2024-04-05 RX ORDER — SODIUM CHLORIDE 0.9 % (FLUSH) 0.9 %
5-40 SYRINGE (ML) INJECTION PRN
Status: DISCONTINUED | OUTPATIENT
Start: 2024-04-05 | End: 2024-04-05 | Stop reason: HOSPADM

## 2024-04-05 RX ORDER — ONDANSETRON 2 MG/ML
INJECTION INTRAMUSCULAR; INTRAVENOUS PRN
Status: DISCONTINUED | OUTPATIENT
Start: 2024-04-05 | End: 2024-04-05 | Stop reason: SDUPTHER

## 2024-04-05 RX ORDER — NALOXONE HYDROCHLORIDE 0.4 MG/ML
INJECTION, SOLUTION INTRAMUSCULAR; INTRAVENOUS; SUBCUTANEOUS PRN
Status: DISCONTINUED | OUTPATIENT
Start: 2024-04-05 | End: 2024-04-05 | Stop reason: HOSPADM

## 2024-04-05 RX ORDER — PROCHLORPERAZINE EDISYLATE 5 MG/ML
5 INJECTION INTRAMUSCULAR; INTRAVENOUS
Status: COMPLETED | OUTPATIENT
Start: 2024-04-05 | End: 2024-04-05

## 2024-04-05 RX ORDER — OXYCODONE HYDROCHLORIDE 5 MG/1
5 TABLET ORAL PRN
Status: DISCONTINUED | OUTPATIENT
Start: 2024-04-05 | End: 2024-04-05 | Stop reason: HOSPADM

## 2024-04-05 RX ORDER — LIDOCAINE HYDROCHLORIDE 10 MG/ML
1 INJECTION, SOLUTION EPIDURAL; INFILTRATION; INTRACAUDAL; PERINEURAL
Status: DISCONTINUED | OUTPATIENT
Start: 2024-04-05 | End: 2024-04-05 | Stop reason: HOSPADM

## 2024-04-05 RX ORDER — SODIUM CHLORIDE 0.9 % (FLUSH) 0.9 %
5-40 SYRINGE (ML) INJECTION EVERY 12 HOURS SCHEDULED
Status: DISCONTINUED | OUTPATIENT
Start: 2024-04-05 | End: 2024-04-05 | Stop reason: HOSPADM

## 2024-04-05 RX ORDER — MAGNESIUM HYDROXIDE 1200 MG/15ML
LIQUID ORAL CONTINUOUS PRN
Status: DISCONTINUED | OUTPATIENT
Start: 2024-04-05 | End: 2024-04-05 | Stop reason: HOSPADM

## 2024-04-05 RX ORDER — MIDAZOLAM HYDROCHLORIDE 1 MG/ML
INJECTION INTRAMUSCULAR; INTRAVENOUS PRN
Status: DISCONTINUED | OUTPATIENT
Start: 2024-04-05 | End: 2024-04-05 | Stop reason: SDUPTHER

## 2024-04-05 RX ORDER — OXYCODONE HYDROCHLORIDE 5 MG/1
10 TABLET ORAL PRN
Status: DISCONTINUED | OUTPATIENT
Start: 2024-04-05 | End: 2024-04-05 | Stop reason: HOSPADM

## 2024-04-05 RX ORDER — DEXAMETHASONE SODIUM PHOSPHATE 10 MG/ML
INJECTION INTRAMUSCULAR; INTRAVENOUS PRN
Status: DISCONTINUED | OUTPATIENT
Start: 2024-04-05 | End: 2024-04-05 | Stop reason: SDUPTHER

## 2024-04-05 RX ORDER — HYDRALAZINE HYDROCHLORIDE 20 MG/ML
10 INJECTION INTRAMUSCULAR; INTRAVENOUS
Status: DISCONTINUED | OUTPATIENT
Start: 2024-04-05 | End: 2024-04-05 | Stop reason: HOSPADM

## 2024-04-05 RX ORDER — SODIUM CHLORIDE 9 MG/ML
INJECTION, SOLUTION INTRAVENOUS PRN
Status: DISCONTINUED | OUTPATIENT
Start: 2024-04-05 | End: 2024-04-05 | Stop reason: HOSPADM

## 2024-04-05 RX ORDER — BUPIVACAINE HYDROCHLORIDE 5 MG/ML
INJECTION, SOLUTION EPIDURAL; INTRACAUDAL
Status: COMPLETED | OUTPATIENT
Start: 2024-04-05 | End: 2024-04-05

## 2024-04-05 RX ORDER — LABETALOL HYDROCHLORIDE 5 MG/ML
10 INJECTION, SOLUTION INTRAVENOUS
Status: DISCONTINUED | OUTPATIENT
Start: 2024-04-05 | End: 2024-04-05 | Stop reason: HOSPADM

## 2024-04-05 RX ORDER — SODIUM CHLORIDE, SODIUM LACTATE, POTASSIUM CHLORIDE, CALCIUM CHLORIDE 600; 310; 30; 20 MG/100ML; MG/100ML; MG/100ML; MG/100ML
INJECTION, SOLUTION INTRAVENOUS CONTINUOUS
Status: DISCONTINUED | OUTPATIENT
Start: 2024-04-05 | End: 2024-04-05 | Stop reason: HOSPADM

## 2024-04-05 RX ORDER — FENTANYL CITRATE 0.05 MG/ML
25 INJECTION, SOLUTION INTRAMUSCULAR; INTRAVENOUS EVERY 5 MIN PRN
Status: DISCONTINUED | OUTPATIENT
Start: 2024-04-05 | End: 2024-04-05 | Stop reason: HOSPADM

## 2024-04-05 RX ORDER — FENTANYL CITRATE 50 UG/ML
INJECTION, SOLUTION INTRAMUSCULAR; INTRAVENOUS PRN
Status: DISCONTINUED | OUTPATIENT
Start: 2024-04-05 | End: 2024-04-05 | Stop reason: SDUPTHER

## 2024-04-05 RX ORDER — ONDANSETRON 2 MG/ML
4 INJECTION INTRAMUSCULAR; INTRAVENOUS
Status: COMPLETED | OUTPATIENT
Start: 2024-04-05 | End: 2024-04-05

## 2024-04-05 RX ORDER — MEPERIDINE HYDROCHLORIDE 25 MG/ML
12.5 INJECTION INTRAMUSCULAR; INTRAVENOUS; SUBCUTANEOUS EVERY 5 MIN PRN
Status: DISCONTINUED | OUTPATIENT
Start: 2024-04-05 | End: 2024-04-05 | Stop reason: HOSPADM

## 2024-04-05 RX ORDER — FENTANYL CITRATE 0.05 MG/ML
50 INJECTION, SOLUTION INTRAMUSCULAR; INTRAVENOUS EVERY 5 MIN PRN
Status: DISCONTINUED | OUTPATIENT
Start: 2024-04-05 | End: 2024-04-05 | Stop reason: HOSPADM

## 2024-04-05 RX ORDER — OXYCODONE HYDROCHLORIDE AND ACETAMINOPHEN 5; 325 MG/1; MG/1
1 TABLET ORAL EVERY 6 HOURS PRN
Qty: 12 TABLET | Refills: 0 | Status: SHIPPED | OUTPATIENT
Start: 2024-04-05 | End: 2024-04-08

## 2024-04-05 RX ORDER — KETOROLAC TROMETHAMINE 30 MG/ML
INJECTION, SOLUTION INTRAMUSCULAR; INTRAVENOUS PRN
Status: DISCONTINUED | OUTPATIENT
Start: 2024-04-05 | End: 2024-04-05 | Stop reason: SDUPTHER

## 2024-04-05 RX ORDER — LIDOCAINE HYDROCHLORIDE 20 MG/ML
INJECTION, SOLUTION INTRAVENOUS PRN
Status: DISCONTINUED | OUTPATIENT
Start: 2024-04-05 | End: 2024-04-05 | Stop reason: SDUPTHER

## 2024-04-05 RX ORDER — ROCURONIUM BROMIDE 10 MG/ML
INJECTION, SOLUTION INTRAVENOUS PRN
Status: DISCONTINUED | OUTPATIENT
Start: 2024-04-05 | End: 2024-04-05 | Stop reason: SDUPTHER

## 2024-04-05 RX ADMIN — HYDROMORPHONE HYDROCHLORIDE 0.5 MG: 1 INJECTION, SOLUTION INTRAMUSCULAR; INTRAVENOUS; SUBCUTANEOUS at 13:55

## 2024-04-05 RX ADMIN — MIDAZOLAM HYDROCHLORIDE 2 MG: 1 INJECTION, SOLUTION INTRAMUSCULAR; INTRAVENOUS at 11:51

## 2024-04-05 RX ADMIN — SODIUM CHLORIDE, POTASSIUM CHLORIDE, SODIUM LACTATE AND CALCIUM CHLORIDE: 600; 310; 30; 20 INJECTION, SOLUTION INTRAVENOUS at 16:18

## 2024-04-05 RX ADMIN — ROCURONIUM BROMIDE 50 MG: 10 INJECTION, SOLUTION INTRAVENOUS at 11:57

## 2024-04-05 RX ADMIN — HYDROMORPHONE HYDROCHLORIDE 0.5 MG: 1 INJECTION, SOLUTION INTRAMUSCULAR; INTRAVENOUS; SUBCUTANEOUS at 13:46

## 2024-04-05 RX ADMIN — SUGAMMADEX 200 MG: 100 INJECTION, SOLUTION INTRAVENOUS at 13:36

## 2024-04-05 RX ADMIN — FENTANYL CITRATE 50 MCG: 0.05 INJECTION, SOLUTION INTRAMUSCULAR; INTRAVENOUS at 14:14

## 2024-04-05 RX ADMIN — FENTANYL CITRATE 50 MCG: 50 INJECTION, SOLUTION INTRAMUSCULAR; INTRAVENOUS at 12:20

## 2024-04-05 RX ADMIN — HYDROMORPHONE HYDROCHLORIDE 0.5 MG: 1 INJECTION, SOLUTION INTRAMUSCULAR; INTRAVENOUS; SUBCUTANEOUS at 12:58

## 2024-04-05 RX ADMIN — ONDANSETRON 4 MG: 2 INJECTION INTRAMUSCULAR; INTRAVENOUS at 15:08

## 2024-04-05 RX ADMIN — HYDROMORPHONE HYDROCHLORIDE 0.5 MG: 1 INJECTION, SOLUTION INTRAMUSCULAR; INTRAVENOUS; SUBCUTANEOUS at 13:28

## 2024-04-05 RX ADMIN — BUPIVACAINE HYDROCHLORIDE 50 ML: 5 INJECTION, SOLUTION EPIDURAL; INTRACAUDAL; PERINEURAL at 12:00

## 2024-04-05 RX ADMIN — LIDOCAINE HYDROCHLORIDE 40 MG: 20 INJECTION, SOLUTION INTRAVENOUS at 11:56

## 2024-04-05 RX ADMIN — CEFAZOLIN 2000 MG: 2 INJECTION, POWDER, FOR SOLUTION INTRAMUSCULAR; INTRAVENOUS at 12:02

## 2024-04-05 RX ADMIN — ONDANSETRON 4 MG: 2 INJECTION INTRAMUSCULAR; INTRAVENOUS at 13:20

## 2024-04-05 RX ADMIN — SODIUM CHLORIDE, POTASSIUM CHLORIDE, SODIUM LACTATE AND CALCIUM CHLORIDE: 600; 310; 30; 20 INJECTION, SOLUTION INTRAVENOUS at 13:38

## 2024-04-05 RX ADMIN — FENTANYL CITRATE 25 MCG: 50 INJECTION, SOLUTION INTRAMUSCULAR; INTRAVENOUS at 12:00

## 2024-04-05 RX ADMIN — DEXAMETHASONE SODIUM PHOSPHATE 10 MG: 10 INJECTION INTRAMUSCULAR; INTRAVENOUS at 12:10

## 2024-04-05 RX ADMIN — SODIUM CHLORIDE, POTASSIUM CHLORIDE, SODIUM LACTATE AND CALCIUM CHLORIDE: 600; 310; 30; 20 INJECTION, SOLUTION INTRAVENOUS at 11:29

## 2024-04-05 RX ADMIN — PROPOFOL 160 MG: 10 INJECTION, EMULSION INTRAVENOUS at 11:56

## 2024-04-05 RX ADMIN — PROCHLORPERAZINE EDISYLATE 5 MG: 5 INJECTION INTRAMUSCULAR; INTRAVENOUS at 14:05

## 2024-04-05 RX ADMIN — KETOROLAC TROMETHAMINE 30 MG: 30 INJECTION, SOLUTION INTRAMUSCULAR at 13:22

## 2024-04-05 RX ADMIN — FENTANYL CITRATE 25 MCG: 50 INJECTION, SOLUTION INTRAMUSCULAR; INTRAVENOUS at 11:54

## 2024-04-05 RX ADMIN — ROCURONIUM BROMIDE 10 MG: 10 INJECTION, SOLUTION INTRAVENOUS at 12:59

## 2024-04-05 ASSESSMENT — PAIN DESCRIPTION - PAIN TYPE: TYPE: SURGICAL PAIN

## 2024-04-05 ASSESSMENT — PAIN DESCRIPTION - ORIENTATION
ORIENTATION: MID
ORIENTATION: MID

## 2024-04-05 ASSESSMENT — LIFESTYLE VARIABLES: SMOKING_STATUS: 1

## 2024-04-05 ASSESSMENT — PAIN SCALES - GENERAL
PAINLEVEL_OUTOF10: 8
PAINLEVEL_OUTOF10: 5
PAINLEVEL_OUTOF10: 8
PAINLEVEL_OUTOF10: 8
PAINLEVEL_OUTOF10: 0
PAINLEVEL_OUTOF10: 5

## 2024-04-05 ASSESSMENT — PAIN DESCRIPTION - DESCRIPTORS
DESCRIPTORS: PRESSURE
DESCRIPTORS: PRESSURE

## 2024-04-05 ASSESSMENT — PAIN DESCRIPTION - LOCATION: LOCATION: ABDOMEN

## 2024-04-05 ASSESSMENT — PAIN DESCRIPTION - FREQUENCY: FREQUENCY: CONTINUOUS

## 2024-04-05 NOTE — ANESTHESIA PRE PROCEDURE
IntraVENous PRN Dwayne Jamison MD        ceFAZolin (ANCEF) 2,000 mg in sodium chloride 0.9 % 100 mL IVPB (mini-bag)  2,000 mg IntraVENous On Call to OR Dwayne Jamison MD           Allergies:  No Known Allergies    Problem List:    Patient Active Problem List   Diagnosis Code    Mood disorder (HCC) F39    Tobacco abuse Z72.0    Adult ADHD F90.9    Mixed hyperlipidemia E78.2    Folate deficiency E53.8    Asymptomatic varicose veins of bilateral lower extremities I83.93    Thiamine deficiency E51.9    BMI 27.0-27.9,adult Z68.27    Ventral hernia without obstruction or gangrene K43.9       Past Medical History:        Diagnosis Date    Anxiety disorder 07/30/2015    Depression 07/30/2015    H/O drug abuse ([facility])     Rehab and detox 4/2013, pain pills    Hyperlipidemia     Substance abuse ([facility]) 07/16/2020       Past Surgical History:        Procedure Laterality Date    CHOLECYSTECTOMY  06/07/2013    lapchole with gram    COLONOSCOPY N/A 09/03/2021    COLORECTAL CANCER SCREENING, HIGH RISK performed by George Rao MD at [facility]    HEMORRHOID SURGERY      OTHER SURGICAL HISTORY Right 06/27/2013    Diagnostic Laparoscopy, Repair of RIH with medium light perfix plus system    SKIN BIOPSY         Social History:    Social History     Tobacco Use    Smoking status: Every Day     Current packs/day: 1.50     Average packs/day: 1.5 packs/day for 30.0 years (45.0 ttl pk-yrs)     Types: Cigarettes    Smokeless tobacco: Never   Substance Use Topics    Alcohol use: Not Currently                                Ready to quit: Not Answered  Counseling given: Not Answered      Vital Signs (Current):   Vitals:    04/05/24 1100   BP: 114/69   Pulse: 79   Resp: 16   Temp: 97 °F (36.1 °C)                                              BP Readings from Last 3 Encounters:   04/05/24 114/69   03/29/24 131/72   03/05/24 120/88       NPO Status: Time of last liquid consumption: 2100                        Time of last solid consumption: 1900

## 2024-04-05 NOTE — OP NOTE
Operative Note      Patient: Alejandro Talley  YOB: 1970  MRN: [id number]    Date of Procedure: 4/5/2024    Pre-Op Diagnosis Codes:     * Ventral hernia without obstruction or gangrene [K43.9]    Post-Op Diagnosis: Same       Procedure(s):  VENTRAL HERNIA REPAIR LAPAROSCOPIC ROBOTIC WITH MESH    Surgeon(s):  Dwayne Jamison MD    Assistant:   First Assistant: Maria Elena Basilio    Anesthesia: General    Estimated Blood Loss (mL): Minimal    Complications: None    Specimens:   * No specimens in log *    Implants:  Implant Name Type Inv. Item Serial No.  Lot No. LRB No. Used Action   MESH SURG DIA4.5IN CIR W/ ECHO 2 POS SYS VENTRALIGHT - [id number]  MESH SURG DIA4.5IN CIR W/ ECHO 2 POS SYS VENTRALIGHT  BARD DAVOL-WD [id number] N/A 1 Implanted         Drains:   NG/OG/NJ/NE Tube Orogastric 18 fr Center mouth (Active)       Findings:  Infection Present At Time Of Surgery (PATOS) (choose all levels that have infection present):  No infection present  Other Findings: Ventral hernia    Detailed Description of Procedure:   The patient was brought to the operating room and laid in a supine position. Appropriate monitors were applied, and the patient was intubated and general anesthesia was achieved. The patient's abdomen was prepped and draped in a sterile fashion.    An incision was made in the left upper quadrant. Veress needle was placed. Then, using a 8 mm bladeless trocar with the laparoscope in place, the abdomen was entered with the laparoscope visualizing the entrance. This was done without difficulty. Diagnostic laparoscopy was performed. An additional 8 mm bladeless trocar was placed in the left lower quadrant. An additional 8 mm bladeless trocar was placed in the left upper quadrant.    The patient was placed in Trendelenburg position with the patient tilted with her left side up, right side down. The robotic arms are docked and Adhesiolysis was performed, taking omental adhesions off the  abdominal wall in the midline. There was a 4 cm fascial defect noted in the midline. Once these adhesions were taken down, the areas were inspected. There was no dissection or any visceral structures. The hernia sac was reduced into the abdominal cavity, and the hernia sac grasped. These structures were dissected off the hernia sac on the adhesions with care taken not to dissect directly on the visceral structures. The entire area was freed up. The adhesions to the sac were freed up circumferentially. The fascial defect was cleared of tissue. It was measured to be approximately a 4 x 3 cm fascial defect. The defect stitched with 0 V lock stitch and then a piece of composite mesh, approximately 10 x 10 cm, was chosen. It was laid on the proper longitudinal axis. This overlapped the fascial defect circumferentially, adequately.        Next, 0 V loc sutures were placed on the mesh. The mesh was placed into the abdomen, and Vicryl sutures were used to pull the mesh up against the abdomen wall taught. Then, the mesh was stitched to the abdominal wall circumferentially using the 2/0 V lock stitches. The mesh overlapped the fascial defect adequately throughout the entire area.    Satisfied with the repair of the area, a diagnostic laparoscopy was performed. There was no abnormal fluid collection or any active bleeding noted. Satisfied with the repair, the procedure was then completed. Pneumoperitoneum was released. The skin was closed using 4-0 Vicryl in subcuticular fashion. Port sites were infiltrated with 0.5% Marcaine with epinephrine. The patient tolerated the procedure well without any complications.      Electronically signed by Dwayne Jamison MD on 4/5/2024 at 1:27 PM

## 2024-04-05 NOTE — PROGRESS NOTES
Pt wants to just take a few more minutes to relax and  waiting for some of the nausea to subside he is not vomiting at this time, stable eyes closed appropriate  to situation

## 2024-04-05 NOTE — PROGRESS NOTES
Patient mediated by CRNA upon arrival to [facility].  Patient still having pain. Pain med given with some relief. Nausea complaints, meds given with some relief. Adjusted Abd binder, pain better per patient.

## 2024-04-05 NOTE — ANESTHESIA PROCEDURE NOTES
Peripheral Block    Patient location during procedure: OR  Reason for block: post-op pain management and at surgeon's request  Start time: 4/5/2024 11:55 AM  End time: 4/5/2024 12:00 PM  Staffing  Performed: anesthesiologist   Anesthesiologist: Melchor George MD  Performed by: Melchor George MD  Authorized by: Melchor George MD    Preanesthetic Checklist  Completed: patient identified, IV checked, site marked, risks and benefits discussed, surgical/procedural consents, equipment checked, pre-op evaluation, timeout performed, anesthesia consent given, oxygen available and monitors applied/VS acknowledged  Peripheral Block   Patient position: supine  Prep: ChloraPrep  Provider prep: mask and sterile gloves (Sterile probe cover)  Patient monitoring: cardiac monitor, continuous pulse ox, frequent blood pressure checks and IV access  Block type: TAP  Laterality: bilateral  Injection technique: single-shot  Guidance: nerve stimulator and ultrasound guided  Local infiltration: bupivacaine  Infiltration strength: 0.5 %  Local infiltration: bupivacaine  Dose: 50 mL    Needle   Needle type: combined needle/nerve stimulator   Needle gauge: 22 G  Needle localization: anatomical landmarks and ultrasound guidance  Needle length: 10 cm  Assessment   Injection assessment: negative aspiration for heme, no paresthesia on injection and local visualized surrounding nerve on ultrasound  Paresthesia pain: none  Slow fractionated injection: yes  Hemodynamics: stable  Outcomes: uncomplicated    Additional Notes  Ultrasound image printed and saved in patient chart.    Sterile probe cover used    25 mls given per side  Medications Administered  BUPivacaine (MARCAINE) PF injection 0.5% - Perineural   50 mL - 4/5/2024 12:00:00 PM

## 2024-04-05 NOTE — ANESTHESIA POSTPROCEDURE EVALUATION
Department of Anesthesiology  Postprocedure Note    Patient: Alejandro Talley  MRN: [id number]  YOB: 1970  Date of evaluation: 4/5/2024    Procedure Summary       Date: 04/05/24 Room / Location: [address]    Anesthesia Start: 1151 Anesthesia Stop: 1356    Procedure: VENTRAL HERNIA REPAIR LAPAROSCOPIC ROBOTIC WITH MESH (Abdomen) Diagnosis:       Ventral hernia without obstruction or gangrene      (Ventral hernia without obstruction or gangrene [K43.9])    Surgeons: Dwayne Jamison MD Responsible Provider: Melchor George MD    Anesthesia Type: general ASA Status: 2            Anesthesia Type: No value filed.    Adam Phase I: Adam Score: 10    Adam Phase II:      Anesthesia Post Evaluation    Patient location during evaluation: PACU  Patient participation: complete - patient participated  Level of consciousness: awake and alert  Airway patency: patent  Nausea & Vomiting: no nausea and no vomiting  Cardiovascular status: blood pressure returned to baseline and hemodynamically stable  Respiratory status: acceptable  Hydration status: euvolemic  Pain management: adequate        No notable events documented.

## 2024-04-05 NOTE — H&P
GENERAL SURGERY  NEW PATIENT HISTORY AND PHYSICAL NOTE    Pt Name: Alejandro Talley  MRN: [id number]    Date: 4/5/2024    Primary Care Physician: Pati Mustafa PA-C    Reason for evaluation: ventral hernia      SUBJECTIVE:     History of Chief Complaint:    Alejandro is a 53 y.o. male who presents with ventral hernia. He  is stating that he realized that he had a hernia couple months ago and he is willing this hernia to be fixed.     Past Medical History:   Diagnosis Date    Anxiety disorder 07/30/2015    Depression 07/30/2015    H/O drug abuse (HCC)     Rehab and detox 4/2013, pain pills    Hyperlipidemia     Substance abuse (HCC) 07/16/2020     Past Surgical History:   Procedure Laterality Date    CHOLECYSTECTOMY  06/07/2013    lapchole with gram    COLONOSCOPY N/A 09/03/2021    COLORECTAL CANCER SCREENING, HIGH RISK performed by George Rao MD at [facility]    HEMORRHOID SURGERY      OTHER SURGICAL HISTORY Right 06/27/2013    Diagnostic Laparoscopy, Repair of RIH with medium light perfix plus system    SKIN BIOPSY       Prior to Admission medications    Medication Sig Start Date End Date Taking? Authorizing Provider   Zinc Oxide-Vitamin C (ZINC PLUS VITAMIN C PO) Take by mouth    ProviderBart MD   TURMERIC CURCUMIN PO Take by mouth    ProviderBart MD   ondansetron (ZOFRAN-ODT) 4 MG disintegrating tablet Take 1 tablet by mouth 3 times daily as needed for Nausea or Vomiting 3/5/24   Pati Mustafa PA-C   thiamine 100 MG tablet Take 1 tablet by mouth daily 2/12/24   Pati Mustafa PA-C   Varenicline Tartrate, Starter, (CHANTIX STARTING MONTH ANALY) 0.5 MG X 11 & 1 MG X 42 TBPK Take per instructions.  Take with food.  Second dose with dinner.  Patient not taking: Reported on 3/29/2024 1/23/24   Pati Mustafa PA-C   varenicline (CHANTIX CONTINUING MONTH ANALY) 1 MG tablet Take 1 tablet by mouth 2 times daily  Patient not taking: Reported on 3/29/2024 1/23/24   Pati Mustafa PA-C  texture, turgor normal. No rashes or lesions  LYMPH: No inguinal nodes  HEENT: Head is normocephalic, atraumatic. EOMI  NECK: Supple, symmetrical, trachea midline, skin normal  PULM: Chest symmetric, clear to auscultation bilaterally without wheezes, rales or rhonchi. No increased work of breathing or accessory muscle use  CV: Heart regular rate and rhythm, no murmurs appreciated  ABD: Soft, nontender, nondistended, no palpable masses, there is a ventral hernia about 3 cm fascia defect in diameter.  GROIN:  no palapble right or left inguinal defects  NEURO: No focalizing motor or sensory deficits   EXTREMITIES: Warm, dry, no lower extremity edema    LABS:   No results for input(s): \"WBC\", \"HGB\", \"HCT\", \"PLT\", \"NA\", \"K\", \"CL\", \"CO2\", \"BUN\", \"CREATININE\", \"MG\", \"PHOS\", \"CALCIUM\", \"PTT\", \"INR\", \"AST\", \"ALT\", \"BILITOT\", \"BILIDIR\", \"AMYLASE\", \"LIPASE\", \"LDH\", \"LACTA\", \"NITRU\", \"COLORU\", \"BACTERIA\" in the last 72 hours.    Invalid input(s): \"PT\", \"WBCU\", \"RBCU\", \"LEUKOCYTESUA\"    RADIOLOGY:   I have personally reviewed the following films:  CT scan abd/pelvis: See radiology report  Abd flat plate: N/A  US: normal and N/A  HIDA scan: N/A    ASSESSMENT AND PLAN:   Alejandro Talley is a 53 y.o. male who presents with ventral hernia. He  is stating that he realized that he had a hernia couple months ago and he is willing this hernia to be fixed.     Patient with symptomatic ventral hernia  OR today for Robotic ventral hernia repair with mesh.    Dwayne Jamison MD   General surgeon

## 2024-04-15 ENCOUNTER — OFFICE VISIT (OUTPATIENT)
Dept: SURGERY | Age: 54
End: 2024-04-15

## 2024-04-15 VITALS
SYSTOLIC BLOOD PRESSURE: 126 MMHG | HEART RATE: 65 BPM | OXYGEN SATURATION: 100 % | TEMPERATURE: 97.3 F | BODY MASS INDEX: 28.28 KG/M2 | WEIGHT: 202 LBS | DIASTOLIC BLOOD PRESSURE: 82 MMHG | HEIGHT: 71 IN

## 2024-04-15 DIAGNOSIS — K43.9 VENTRAL HERNIA WITHOUT OBSTRUCTION OR GANGRENE: Primary | ICD-10-CM

## 2024-04-15 PROCEDURE — 99024 POSTOP FOLLOW-UP VISIT: CPT | Performed by: SURGERY

## 2024-04-15 NOTE — PROGRESS NOTES
GENERAL SURGERY CLINIC NOTE    Pt Name: Alejandro Talley  MRN: [id number]  Date: 4/15/2024        SUBJECTIVE:     History of Chief Complaint:    Alejandro is a 53 y.o. male who presents with s/p Robotic ventral hernia repair with mesh. He  is stating that he came back to his baseline about a week after the surgery.    Past Medical History:   Diagnosis Date    Anxiety disorder 07/30/2015    Depression 07/30/2015    H/O drug abuse (HCC)     Rehab and detox 4/2013, pain pills    Hyperlipidemia     Substance abuse (HCC) 07/16/2020     Past Surgical History:   Procedure Laterality Date    CHOLECYSTECTOMY  06/07/2013    lapchole with gram    COLONOSCOPY N/A 09/03/2021    COLORECTAL CANCER SCREENING, HIGH RISK performed by George Rao MD at [facility]    HEMORRHOID SURGERY      OTHER SURGICAL HISTORY Right 06/27/2013    Diagnostic Laparoscopy, Repair of RIH with medium light perfix plus system    SKIN BIOPSY      VENTRAL HERNIA REPAIR N/A 4/5/2024    VENTRAL HERNIA REPAIR LAPAROSCOPIC ROBOTIC WITH MESH performed by Dawyne Jamison MD at [facility] OR     Prior to Admission medications    Medication Sig Start Date End Date Taking? Authorizing Provider   Zinc Oxide-Vitamin C (ZINC PLUS VITAMIN C PO) Take by mouth   Yes ProviderBart MD   TURMERIC CURCUMIN PO Take by mouth   Yes ProviderBart MD   ondansetron (ZOFRAN-ODT) 4 MG disintegrating tablet Take 1 tablet by mouth 3 times daily as needed for Nausea or Vomiting 3/5/24  Yes Pati Mustafa PA-C   thiamine 100 MG tablet Take 1 tablet by mouth daily 2/12/24  Yes Pati Mustafa PA-C   buprenorphine-naloxone (SUBOXONE) 8-2 MG FILM SL film DISSOLVE 1 (ONE) film UNDER THE TONGUE EVERY DAY 5/18/21  Yes ProviderBart MD     No Known Allergies  Family History   Problem Relation Age of Onset    Breast Cancer Mother 50    Cancer Mother     Depression Mother     Cancer Father         Colon    High Cholesterol Father     Colon Cancer Father      Social History

## 2024-05-21 RX ORDER — ONDANSETRON 4 MG/1
4 TABLET, ORALLY DISINTEGRATING ORAL 3 TIMES DAILY PRN
Qty: 90 TABLET | Refills: 2 | Status: SHIPPED | OUTPATIENT
Start: 2024-05-21

## 2024-06-17 ENCOUNTER — TELEMEDICINE (OUTPATIENT)
Dept: FAMILY MEDICINE CLINIC | Age: 54
End: 2024-06-17
Payer: COMMERCIAL

## 2024-06-17 DIAGNOSIS — K59.00 CONSTIPATION, UNSPECIFIED CONSTIPATION TYPE: ICD-10-CM

## 2024-06-17 DIAGNOSIS — Z90.49 HISTORY OF CHOLECYSTECTOMY: ICD-10-CM

## 2024-06-17 DIAGNOSIS — R11.2 INTRACTABLE NAUSEA AND VOMITING: Primary | ICD-10-CM

## 2024-06-17 DIAGNOSIS — E51.9 THIAMINE DEFICIENCY: ICD-10-CM

## 2024-06-17 DIAGNOSIS — E78.2 MIXED HYPERLIPIDEMIA: ICD-10-CM

## 2024-06-17 PROCEDURE — 99214 OFFICE O/P EST MOD 30 MIN: CPT | Performed by: PHYSICIAN ASSISTANT

## 2024-06-17 RX ORDER — ONDANSETRON 4 MG/1
4 TABLET, ORALLY DISINTEGRATING ORAL 3 TIMES DAILY PRN
Qty: 90 TABLET | Refills: 2 | Status: SHIPPED | OUTPATIENT
Start: 2024-06-17

## 2024-06-17 ASSESSMENT — ENCOUNTER SYMPTOMS
COLOR CHANGE: 0
VOICE CHANGE: 0
NAUSEA: 1
WHEEZING: 0
SHORTNESS OF BREATH: 0
CONSTIPATION: 1
CHEST TIGHTNESS: 0
ABDOMINAL PAIN: 0
TROUBLE SWALLOWING: 0
ABDOMINAL DISTENTION: 1
RECTAL PAIN: 0
DIARRHEA: 0
COUGH: 0

## 2024-06-17 NOTE — PROGRESS NOTES
Alejandro Talley, was evaluated through a synchronous (real-time) audio-video encounter. The patient (or guardian if applicable) is aware that this is a billable service, which includes applicable co-pays. This Virtual Visit was conducted with patient's (and/or legal guardian's) consent. Patient identification was verified, and a caregiver was present when appropriate.   The patient was located at Home: [address] [place] [address] [place] [address]  Provider was located at Facility (Appt Dept): [address] [place] [address]  [address] [facility]  [place]  [place] [address]  Confirm you are appropriately licensed, registered, or certified to deliver care in the state where the patient is located as indicated above. If you are not or unsure, please re-schedule the visit: Yes, I confirm.     Alejandro Talley (:  1970) is a Established patient, presenting virtually for evaluation of the following:    Assessment & Plan   Below is the assessment and plan developed based on review of pertinent history, physical exam, labs, studies, and medications.  1. Intractable nausea and vomiting  -     CBC with Auto Differential; Future  -     Comprehensive Metabolic Panel; Future  -     US ABDOMEN LIMITED; Future  -     George Lange MD, Gastroenterology, [place]  -     Lipase; Future  -     ondansetron (ZOFRAN-ODT) 4 MG disintegrating tablet; Take 1 tablet by mouth 3 times daily as needed for Nausea or Vomiting, Disp-90 tablet, R-2Run as cash pay.Normal  2. History of cholecystectomy  -     US ABDOMEN LIMITED; Future  3. Thiamine deficiency  -     Vitamin B1; Future  4. Mixed hyperlipidemia  5. Constipation, unspecified constipation type  -     XR ABDOMEN (2 VIEWS); Future  Follow up with me once testing is complete.    No follow-ups on file.       Subjective   HPI  Last OV with me: 3/5/24.   Follow up with me today.     Had vental hernia repair surgery with Dr. Jamison on 24.   Abdominal pain has improved.     Has been getting very nauseous, chronically.

## 2024-06-20 ENCOUNTER — OFFICE VISIT (OUTPATIENT)
Dept: GASTROENTEROLOGY | Age: 54
End: 2024-06-20
Payer: COMMERCIAL

## 2024-06-20 VITALS
DIASTOLIC BLOOD PRESSURE: 70 MMHG | WEIGHT: 187 LBS | HEART RATE: 89 BPM | BODY MASS INDEX: 26.08 KG/M2 | OXYGEN SATURATION: 98 % | SYSTOLIC BLOOD PRESSURE: 128 MMHG

## 2024-06-20 DIAGNOSIS — R11.2 NAUSEA AND VOMITING IN ADULT: ICD-10-CM

## 2024-06-20 DIAGNOSIS — K59.00 CONSTIPATION, UNSPECIFIED CONSTIPATION TYPE: Primary | ICD-10-CM

## 2024-06-20 DIAGNOSIS — F12.90 MARIJUANA USE: ICD-10-CM

## 2024-06-20 DIAGNOSIS — R63.4 WEIGHT LOSS, NON-INTENTIONAL: ICD-10-CM

## 2024-06-20 PROCEDURE — 99203 OFFICE O/P NEW LOW 30 MIN: CPT | Performed by: NURSE PRACTITIONER

## 2024-06-20 RX ORDER — POLYETHYLENE GLYCOL 3350 17 G/17G
17 POWDER, FOR SOLUTION ORAL DAILY
Qty: 510 G | Refills: 3 | Status: SHIPPED | OUTPATIENT
Start: 2024-06-20 | End: 2024-07-20

## 2024-06-20 NOTE — PROGRESS NOTES
Gastroenterology Clinic Follow up Visit    Alejandro Talley  [id number]  Chief Complaint   Patient presents with    Nausea & Vomiting       HPI: 53 y.o. male following up for nausea, vomiting and abdominal pain.    Interval change: Patient is follow up to GI clinic for intractable nausea and vomiting and abdominal pain.  Patient reports symptoms have been consistent for the last year occurring every day.  Patient reports hot showers relieve the nausea. He does endorse a time period of 6 months occurring one year ago while living in [place] without symptoms.  Patient and significant other report no use of marijuana during this time.  Patient is s/p robotic ventral hernia repair with mesh per Dr. Jamison 4/5/2024.  Patient reports significant improvement in abdominal pain after surgery.  Patient denies any esophageal reflux nor epigastric pain/burning.  Patient reports having 1 hard bowel movement every 5 days.  He does endorse 15 to 20 lbs of weight loss with decreased oral intake due to nausea and vomiting. He denies any blood nor mucus, however does endorse straining with every bowel movement.   Smokes 1.5 packs per day  Smokes marijuana daily for over 20 years  Family history of colorectal cancer.    Previous GI work up/Endoscopic investigations:  Colonoscopy 9/3/2021 Farida Castillo  Normal exam. No specimen collected.     Health Maintenance Surveillance Colonoscopy 9/2026    Review of Systems   Gastrointestinal:  Positive for nausea and vomiting.   All other systems reviewed and are negative.     Past medical history, past surgical history, medication list, social and familyhistory reviewed    Blood pressure 128/70, pulse 89, weight 84.8 kg (187 lb), SpO2 98 %.    Physical Exam  Vitals reviewed.   Constitutional:       General: He is not in acute distress.     Appearance: Normal appearance.   Eyes:      General: No scleral icterus.     Pupils: Pupils are equal, round, and reactive to light.   Cardiovascular:      Rate

## 2024-06-21 ASSESSMENT — ENCOUNTER SYMPTOMS
VOMITING: 1
NAUSEA: 1

## 2024-06-29 ENCOUNTER — HOSPITAL ENCOUNTER (OUTPATIENT)
Dept: GENERAL RADIOLOGY | Age: 54
End: 2024-06-29
Payer: COMMERCIAL

## 2024-06-29 ENCOUNTER — HOSPITAL ENCOUNTER (OUTPATIENT)
Dept: ULTRASOUND IMAGING | Age: 54
End: 2024-06-29
Payer: COMMERCIAL

## 2024-06-29 DIAGNOSIS — R11.2 INTRACTABLE NAUSEA AND VOMITING: ICD-10-CM

## 2024-06-29 DIAGNOSIS — K59.00 CONSTIPATION, UNSPECIFIED CONSTIPATION TYPE: ICD-10-CM

## 2024-06-29 DIAGNOSIS — Z90.49 HISTORY OF CHOLECYSTECTOMY: ICD-10-CM

## 2024-06-29 PROCEDURE — 76705 ECHO EXAM OF ABDOMEN: CPT

## 2024-06-29 PROCEDURE — 74019 RADEX ABDOMEN 2 VIEWS: CPT

## 2024-12-08 DIAGNOSIS — R11.2 INTRACTABLE NAUSEA AND VOMITING: ICD-10-CM

## 2024-12-09 RX ORDER — ONDANSETRON 4 MG/1
4 TABLET, ORALLY DISINTEGRATING ORAL 3 TIMES DAILY PRN
Qty: 90 TABLET | Refills: 2 | Status: SHIPPED | OUTPATIENT
Start: 2024-12-09 | End: 2024-12-12 | Stop reason: SDUPTHER

## 2024-12-09 NOTE — TELEPHONE ENCOUNTER
Comments: [employer] message sent    Last Office Visit (last PCP visit):   6/17/2024    Next Visit Date:  No future appointments.    **If hasn't been seen in over a year OR hasn't followed up according to last diabetes/ADHD visit, make appointment for patient before sending refill to provider.    Rx requested:  Requested Prescriptions     Pending Prescriptions Disp Refills    ondansetron (ZOFRAN-ODT) 4 MG disintegrating tablet [Pharmacy Med Name: ONDANSETRON ODT 4 MG TABLET] 90 tablet 2     Sig: TAKE 1 TABLET BY MOUTH 3 TIMES DAILY AS NEEDED FOR NAUSEA OR VOMITING

## 2024-12-12 RX ORDER — ONDANSETRON 4 MG/1
4 TABLET, ORALLY DISINTEGRATING ORAL 3 TIMES DAILY PRN
Qty: 90 TABLET | Refills: 2 | Status: SHIPPED | OUTPATIENT
Start: 2024-12-12

## 2025-02-25 SDOH — ECONOMIC STABILITY: INCOME INSECURITY: IN THE LAST 12 MONTHS, WAS THERE A TIME WHEN YOU WERE NOT ABLE TO PAY THE MORTGAGE OR RENT ON TIME?: NO

## 2025-02-25 SDOH — ECONOMIC STABILITY: FOOD INSECURITY: WITHIN THE PAST 12 MONTHS, THE FOOD YOU BOUGHT JUST DIDN'T LAST AND YOU DIDN'T HAVE MONEY TO GET MORE.: NEVER TRUE

## 2025-02-25 SDOH — ECONOMIC STABILITY: FOOD INSECURITY: WITHIN THE PAST 12 MONTHS, YOU WORRIED THAT YOUR FOOD WOULD RUN OUT BEFORE YOU GOT MONEY TO BUY MORE.: NEVER TRUE

## 2025-02-25 SDOH — ECONOMIC STABILITY: TRANSPORTATION INSECURITY
IN THE PAST 12 MONTHS, HAS THE LACK OF TRANSPORTATION KEPT YOU FROM MEDICAL APPOINTMENTS OR FROM GETTING MEDICATIONS?: NO

## 2025-02-25 SDOH — ECONOMIC STABILITY: TRANSPORTATION INSECURITY
IN THE PAST 12 MONTHS, HAS LACK OF TRANSPORTATION KEPT YOU FROM MEETINGS, WORK, OR FROM GETTING THINGS NEEDED FOR DAILY LIVING?: NO

## 2025-02-26 ENCOUNTER — OFFICE VISIT (OUTPATIENT)
Age: 55
End: 2025-02-26

## 2025-02-26 VITALS
WEIGHT: 221.8 LBS | SYSTOLIC BLOOD PRESSURE: 138 MMHG | HEIGHT: 71 IN | OXYGEN SATURATION: 94 % | BODY MASS INDEX: 31.05 KG/M2 | RESPIRATION RATE: 16 BRPM | DIASTOLIC BLOOD PRESSURE: 88 MMHG | HEART RATE: 97 BPM

## 2025-02-26 DIAGNOSIS — E53.8 FOLATE DEFICIENCY: ICD-10-CM

## 2025-02-26 DIAGNOSIS — E78.2 MIXED HYPERLIPIDEMIA: ICD-10-CM

## 2025-02-26 DIAGNOSIS — R11.2 INTRACTABLE NAUSEA AND VOMITING: ICD-10-CM

## 2025-02-26 DIAGNOSIS — R73.09 ABNORMAL GLUCOSE: ICD-10-CM

## 2025-02-26 DIAGNOSIS — L65.9 HAIR LOSS: ICD-10-CM

## 2025-02-26 DIAGNOSIS — E51.9 THIAMINE DEFICIENCY: ICD-10-CM

## 2025-02-26 DIAGNOSIS — K43.9 VENTRAL HERNIA WITHOUT OBSTRUCTION OR GANGRENE: Primary | ICD-10-CM

## 2025-02-26 DIAGNOSIS — K59.09 CHRONIC CONSTIPATION: ICD-10-CM

## 2025-02-26 DIAGNOSIS — R63.5 WEIGHT GAIN: ICD-10-CM

## 2025-02-26 DIAGNOSIS — Z13.1 DIABETES MELLITUS SCREENING: ICD-10-CM

## 2025-02-26 LAB
ALBUMIN SERPL-MCNC: 4.1 G/DL (ref 3.5–4.6)
ALP SERPL-CCNC: 119 U/L (ref 35–104)
ALT SERPL-CCNC: 29 U/L (ref 0–41)
ANION GAP SERPL CALCULATED.3IONS-SCNC: 11 MEQ/L (ref 9–15)
AST SERPL-CCNC: 17 U/L (ref 0–40)
BASOPHILS # BLD: 0.1 K/UL (ref 0–0.2)
BASOPHILS NFR BLD: 0.6 %
BILIRUB SERPL-MCNC: 0.3 MG/DL (ref 0.2–0.7)
BUN SERPL-MCNC: 10 MG/DL (ref 6–20)
CALCIUM SERPL-MCNC: 9.1 MG/DL (ref 8.5–9.9)
CHLORIDE SERPL-SCNC: 100 MEQ/L (ref 95–107)
CHOLEST SERPL-MCNC: 186 MG/DL (ref 0–199)
CO2 SERPL-SCNC: 26 MEQ/L (ref 20–31)
CREAT SERPL-MCNC: 0.78 MG/DL (ref 0.7–1.2)
EOSINOPHIL # BLD: 0.1 K/UL (ref 0–0.7)
EOSINOPHIL NFR BLD: 0.6 %
ERYTHROCYTE [DISTWIDTH] IN BLOOD BY AUTOMATED COUNT: 13.1 % (ref 11.5–14.5)
GLOBULIN SER CALC-MCNC: 3 G/DL (ref 2.3–3.5)
GLUCOSE SERPL-MCNC: 104 MG/DL (ref 70–99)
HBA1C MFR BLD: 6.5 %
HCT VFR BLD AUTO: 44 % (ref 42–52)
HDLC SERPL-MCNC: 38 MG/DL (ref 40–59)
HGB BLD-MCNC: 14.7 G/DL (ref 14–18)
LDL CHOLESTEROL: 134 MG/DL (ref 0–129)
LIPASE SERPL-CCNC: 26 U/L (ref 12–95)
LYMPHOCYTES # BLD: 1.9 K/UL (ref 1–4.8)
LYMPHOCYTES NFR BLD: 19.3 %
MCH RBC QN AUTO: 30 PG (ref 27–31.3)
MCHC RBC AUTO-ENTMCNC: 33.4 % (ref 33–37)
MCV RBC AUTO: 89.8 FL (ref 79–92.2)
MONOCYTES # BLD: 0.5 K/UL (ref 0.2–0.8)
MONOCYTES NFR BLD: 5.2 %
NEUTROPHILS # BLD: 7.4 K/UL (ref 1.4–6.5)
NEUTS SEG NFR BLD: 73.8 %
PLATELET # BLD AUTO: 192 K/UL (ref 130–400)
POTASSIUM SERPL-SCNC: 4.3 MEQ/L (ref 3.4–4.9)
PROT SERPL-MCNC: 7.1 G/DL (ref 6.3–8)
RBC # BLD AUTO: 4.9 M/UL (ref 4.7–6.1)
SODIUM SERPL-SCNC: 137 MEQ/L (ref 135–144)
T4 FREE SERPL-MCNC: 1.25 NG/DL (ref 0.84–1.68)
TRIGLYCERIDE, FASTING: 68 MG/DL (ref 0–150)
TSH SERPL-MCNC: 1.7 UIU/ML (ref 0.44–3.86)
WBC # BLD AUTO: 10.1 K/UL (ref 4.8–10.8)

## 2025-02-26 ASSESSMENT — PATIENT HEALTH QUESTIONNAIRE - PHQ9
SUM OF ALL RESPONSES TO PHQ QUESTIONS 1-9: 0
1. LITTLE INTEREST OR PLEASURE IN DOING THINGS: NOT AT ALL
SUM OF ALL RESPONSES TO PHQ9 QUESTIONS 1 & 2: 0
2. FEELING DOWN, DEPRESSED OR HOPELESS: NOT AT ALL

## 2025-02-26 ASSESSMENT — ENCOUNTER SYMPTOMS
CONSTIPATION: 1
TROUBLE SWALLOWING: 0
ABDOMINAL DISTENTION: 1
SHORTNESS OF BREATH: 0
CHEST TIGHTNESS: 0
NAUSEA: 1
DIARRHEA: 0
ABDOMINAL PAIN: 0
COUGH: 0
COLOR CHANGE: 0
WHEEZING: 0
VOICE CHANGE: 0
RECTAL PAIN: 0

## 2025-02-26 NOTE — PROGRESS NOTES
Subjective  Alejandro Talley, 54 y.o. male presents today with:  Chief Complaint   Patient presents with    Follow-up     Had hernia surgery but still having problems, bulging in abdomen       HPI  Last OV with me: 6/17/24 via VV.   Wife with patient.     Had lap hernia repair surgery with Dr. Jamison 4/5/24.  Has a large ventral mass of his abdomen.  Did have mesh repair.   Denies lifting/straining/injury.  Chronic constipation.   Has noticed some hair loss.  Admits to not eating well; drinks a lot of mountain dew.  Wife is trying to get him to drink more water/fluids.   +weight gain. 30 lbs since June 2024.   FH of thyroid disease (dad).     Due for labs.  History of thiamine def.     Remains on suboxone for addiction, doing well.      Smoke 1.5 ppd.  Would like to quit.  Was on chantix, but it made him feel nauseous.  Stopped.  Has medication at home.  Would be willing to try again.       Review of Systems   Constitutional:  Positive for fatigue and unexpected weight change. Negative for activity change, appetite change, chills, diaphoresis and fever.   HENT:  Negative for congestion, mouth sores, postnasal drip, trouble swallowing and voice change.    Eyes:  Negative for visual disturbance.   Respiratory:  Negative for cough, chest tightness, shortness of breath and wheezing.    Cardiovascular:  Negative for chest pain, palpitations and leg swelling.   Gastrointestinal:  Positive for abdominal distention, constipation and nausea. Negative for abdominal pain, diarrhea and rectal pain.   Genitourinary:  Negative for difficulty urinating, dysuria, frequency, hematuria, penile pain, penile swelling, scrotal swelling, testicular pain and urgency.        ED   Skin:  Negative for color change and pallor.   Neurological:  Negative for dizziness, seizures, speech difficulty, weakness and numbness.   Psychiatric/Behavioral:  Negative for agitation, behavioral problems, confusion, dysphoric mood and sleep disturbance. The

## 2025-02-27 LAB
FOLATE: 13.4 NG/ML (ref 4.8–24.2)
VITAMIN B-12: 398 PG/ML (ref 232–1245)

## 2025-02-28 LAB
THYROPEROXIDASE IGG SERPL-ACNC: <4 IU/ML (ref 0–25)
TSI SER-ACNC: <0.1 IU/L
VIT B1 BLD-MCNC: 186 NMOL/L (ref 70–180)

## 2025-02-28 NOTE — PATIENT INSTRUCTIONS
[facility] [employer] - Financial Resources*  (Call [employer]/211 if need more resources.)  UTILITY:         [employer]/211:  What they offer: Help find lower cost options for phone or internet as well as help paying utility bills.   Phone Number: 211  Website: https://www.[employer]/get-help/utilities-expenses   [employer]  What they offer:  Assistance with utilities  Phone:  [phone]    [facility]   What they Offer: Assistance with utilities through the Home Energy Assistance Program (HEAP) and the Winter Crisis Program (WCP), also sometimes called Emergency HEAP (E-HEAP).  These programs are designed to help income-eligible consumers with winter heating costs.  Also assists with the application for Percentage of Income Payment Plan (PIPP) which allows eligible residents to reduce their energy bills to a percentage of their income and avoid crisis situations.  Phone: [phone]  Website: https://www.[employer]/    [facility] [place]  What they Offer: Assistance with utilities  Phone: 247.532.1846  Website: https://[email or web address] [facility].[email or web address]/    [employer]   What they Offer: Assistance with utilities and rent   Phone: [phone]   Website: www.[employer].org   Address:  [address], [place] [address]      [facility] Assistance Network ([facility])   What they Offer: Provides the residents of [facility], with year-round access to emergency financial assistance for rent, utilities, or other emergency basic needs--at a single site geographically located in your neighborhood of residence   Website: AN Online Application      [facility]    What they offer: Utility payment assistance, utility payment plans, utility bill assistance, home energy conservation and winter/summer crisis programs.   Phone: [phone]    Fax: [phone]   Address: [facility] [address] [address]   Website:

## 2025-03-11 ENCOUNTER — HOSPITAL ENCOUNTER (OUTPATIENT)
Dept: CT IMAGING | Age: 55
Discharge: HOME | End: 2025-03-13
Payer: COMMERCIAL

## 2025-03-11 DIAGNOSIS — K43.9 VENTRAL HERNIA WITHOUT OBSTRUCTION OR GANGRENE: ICD-10-CM

## 2025-03-11 PROCEDURE — 74176 CT ABD & PELVIS W/O CONTRAST: CPT

## 2025-03-13 ENCOUNTER — RESULTS FOLLOW-UP (OUTPATIENT)
Dept: CT IMAGING | Age: 55
End: 2025-03-13

## 2025-03-13 DIAGNOSIS — Q45.3 PANCREATIC ABNORMALITY: Primary | ICD-10-CM

## 2025-04-04 ENCOUNTER — OFFICE VISIT (OUTPATIENT)
Age: 55
End: 2025-04-04
Payer: COMMERCIAL

## 2025-04-04 VITALS
DIASTOLIC BLOOD PRESSURE: 82 MMHG | BODY MASS INDEX: 31.8 KG/M2 | SYSTOLIC BLOOD PRESSURE: 122 MMHG | HEART RATE: 87 BPM | RESPIRATION RATE: 16 BRPM | OXYGEN SATURATION: 93 % | WEIGHT: 228 LBS

## 2025-04-04 DIAGNOSIS — K86.2 PANCREAS CYST: ICD-10-CM

## 2025-04-04 DIAGNOSIS — R06.02 SHORTNESS OF BREATH: ICD-10-CM

## 2025-04-04 DIAGNOSIS — K59.09 CHRONIC CONSTIPATION: ICD-10-CM

## 2025-04-04 DIAGNOSIS — R68.82 DECREASED LIBIDO: ICD-10-CM

## 2025-04-04 DIAGNOSIS — R19.05 PERIUMBILICAL MASS: ICD-10-CM

## 2025-04-04 DIAGNOSIS — K43.9 VENTRAL HERNIA WITHOUT OBSTRUCTION OR GANGRENE: Primary | ICD-10-CM

## 2025-04-04 DIAGNOSIS — Z72.0 TOBACCO ABUSE: ICD-10-CM

## 2025-04-04 PROCEDURE — G2211 COMPLEX E/M VISIT ADD ON: HCPCS | Performed by: PHYSICIAN ASSISTANT

## 2025-04-04 PROCEDURE — 99214 OFFICE O/P EST MOD 30 MIN: CPT | Performed by: PHYSICIAN ASSISTANT

## 2025-04-04 RX ORDER — NICOTINE 21 MG/24HR
1 PATCH, TRANSDERMAL 24 HOURS TRANSDERMAL DAILY
Qty: 42 PATCH | Refills: 0 | Status: SHIPPED | OUTPATIENT
Start: 2025-04-04 | End: 2025-05-16

## 2025-04-04 RX ORDER — POLYETHYLENE GLYCOL 3350 17 G/17G
17 POWDER, FOR SOLUTION ORAL DAILY
Qty: 1530 G | Refills: 1 | Status: SHIPPED | OUTPATIENT
Start: 2025-04-04 | End: 2025-05-04

## 2025-04-04 ASSESSMENT — ENCOUNTER SYMPTOMS
RECTAL PAIN: 0
ABDOMINAL DISTENTION: 1
VOICE CHANGE: 0
COUGH: 0
DIARRHEA: 0
WHEEZING: 0
SHORTNESS OF BREATH: 0
ABDOMINAL PAIN: 0
TROUBLE SWALLOWING: 0
CONSTIPATION: 1
NAUSEA: 1
COLOR CHANGE: 0
CHEST TIGHTNESS: 0

## 2025-04-04 NOTE — PROGRESS NOTES
Currently    Drug use: Not Currently     Types: Marijuana (Weed)     Comment: occasional    Sexual activity: Yes     Partners: Female   Other Topics Concern    Not on file   Social History Narrative    Not on file     Social Drivers of Health     Financial Resource Strain: Low Risk  (1/23/2024)    Overall Financial Resource Strain (CARDIA)     Difficulty of Paying Living Expenses: Not hard at all   Food Insecurity: No Food Insecurity (2/25/2025)    Hunger Vital Sign     Worried About Running Out of Food in the Last Year: Never true     Ran Out of Food in the Last Year: Never true   Transportation Needs: No Transportation Needs (2/25/2025)    PRAPARE - Transportation     Lack of Transportation (Medical): No     Lack of Transportation (Non-Medical): No   Physical Activity: Not on file   Stress: Not on file   Social Connections: Not on file   Intimate Partner Violence: Not on file   Housing Stability: Low Risk  (2/25/2025)    Housing Stability Vital Sign     Unable to Pay for Housing in the Last Year: No     Number of Times Moved in the Last Year: 0     Homeless in the Last Year: No     Family History   Problem Relation Age of Onset    Breast Cancer Mother 50    Cancer Mother     Depression Mother     Cancer Father         Colon    High Cholesterol Father     Colon Cancer Father     Arthritis Father     Diabetes Father     Diabetes Paternal Cousin     Lupus Paternal Cousin     Obesity Paternal Cousin     Obesity Sister     Psoriasis Sister     Psoriasis Paternal Uncle      No Known Allergies  Current Outpatient Medications   Medication Sig Dispense Refill    polyethylene glycol (GLYCOLAX) 17 GM/SCOOP powder Take 17 g by mouth daily 1530 g 1    nicotine (NICODERM CQ) 21 MG/24HR Place 1 patch onto the skin daily 42 patch 0    ondansetron (ZOFRAN-ODT) 4 MG disintegrating tablet Take 1 tablet by mouth 3 times daily as needed for Nausea or Vomiting 90 tablet 2    TURMERIC CURCUMIN PO Take by mouth      thiamine 100 MG

## 2025-04-13 ENCOUNTER — HOSPITAL ENCOUNTER (OUTPATIENT)
Dept: MRI IMAGING | Age: 55
Discharge: HOME | End: 2025-04-15
Payer: COMMERCIAL

## 2025-04-13 DIAGNOSIS — Q45.3 PANCREATIC ABNORMALITY: ICD-10-CM

## 2025-04-13 PROCEDURE — A9579 GAD-BASE MR CONTRAST NOS,1ML: HCPCS | Performed by: PHYSICIAN ASSISTANT

## 2025-04-13 PROCEDURE — 6360000004 HC RX CONTRAST MEDICATION: Performed by: PHYSICIAN ASSISTANT

## 2025-04-13 PROCEDURE — 74183 MRI ABD W/O CNTR FLWD CNTR: CPT

## 2025-04-13 RX ADMIN — GADOTERIDOL 20 ML: 279.3 INJECTION, SOLUTION INTRAVENOUS at 12:18

## 2025-04-16 ENCOUNTER — RESULTS FOLLOW-UP (OUTPATIENT)
Age: 55
End: 2025-04-16

## 2025-04-25 NOTE — PROGRESS NOTES
Gastroenterology Clinic Follow up Visit    Alejandro Talley  [id number]  Chief Complaint   Patient presents with    Follow-up     HPI: 54 y.o. male following up after last GI clinic on 6/20/2024     Interval change: Follow-up to GI clinic with abnormal MRI.  Patient had incidental findings on abdominal CT with follow-up MRI imaging completed.Patient denies any nausea, vomiting nor abdominal pain.Patient has history of ventral hernia repair 4/5/2024. Patient has had significant improvement of previously reported daily nausea and vomiting since discontinuing cannabis use.  Patient denies any unintentional weight loss, and has actually gained approximately 20 pounds since last visit.  Patient does endorse having bowel movement once every 3 days which is hard in consistency.  He has been trialing \"veggie lax\" on an intermittent basis which facilitates a bowel movement.  Denies any unintentional weight loss, dysphagia, hematemesis, hematochezia, nor melena.    HPI from last GI clinic visit on 6/20/2024 summarized below:  Patient is follow up to GI clinic for intractable nausea and vomiting and abdominal pain.  Patient reports symptoms have been consistent for the last year occurring every day.  Patient reports hot showers relieve the nausea. He does endorse a time period of 6 months occurring one year ago while living in [place] without symptoms.  Patient and significant other report no use of marijuana during this time.  Patient is s/p robotic ventral hernia repair with mesh per Dr. Jamison 4/5/2024.  Patient reports significant improvement in abdominal pain after surgery.  Patient denies any esophageal reflux nor epigastric pain/burning.  Patient reports having 1 hard bowel movement every 5 days.  He does endorse 15 to 20 lbs of weight loss with decreased oral intake due to nausea and vomiting. He denies any blood nor mucus, however does endorse straining with every bowel movement.   Smokes 1.5 packs per day  Smokes

## 2025-04-28 ENCOUNTER — OFFICE VISIT (OUTPATIENT)
Dept: GASTROENTEROLOGY | Age: 55
End: 2025-04-28
Payer: COMMERCIAL

## 2025-04-28 VITALS — HEART RATE: 82 BPM | HEIGHT: 71 IN | OXYGEN SATURATION: 97 % | WEIGHT: 225 LBS | BODY MASS INDEX: 31.5 KG/M2

## 2025-04-28 DIAGNOSIS — R93.5 ABNORMAL MRI OF ABDOMEN: Primary | ICD-10-CM

## 2025-04-28 DIAGNOSIS — K59.00 CONSTIPATION, UNSPECIFIED CONSTIPATION TYPE: ICD-10-CM

## 2025-04-28 DIAGNOSIS — K86.2 CYST OF PANCREAS: ICD-10-CM

## 2025-04-28 PROCEDURE — 99213 OFFICE O/P EST LOW 20 MIN: CPT | Performed by: NURSE PRACTITIONER

## 2025-04-28 ASSESSMENT — ENCOUNTER SYMPTOMS
RECTAL PAIN: 0
ABDOMINAL PAIN: 0
TROUBLE SWALLOWING: 0
ABDOMINAL DISTENTION: 0
VOMITING: 0
CONSTIPATION: 1
DIARRHEA: 0
NAUSEA: 0
BLOOD IN STOOL: 0
ANAL BLEEDING: 0

## 2025-05-20 DIAGNOSIS — R11.2 INTRACTABLE NAUSEA AND VOMITING: ICD-10-CM

## 2025-05-20 RX ORDER — ONDANSETRON 4 MG/1
4 TABLET, ORALLY DISINTEGRATING ORAL 3 TIMES DAILY PRN
Qty: 90 TABLET | Refills: 2 | Status: SHIPPED | OUTPATIENT
Start: 2025-05-20

## 2025-05-20 NOTE — TELEPHONE ENCOUNTER
Comments:     Last Office Visit (last PCP visit):   4/4/2025    Next Visit Date:  Future Appointments   Date Time Provider Department Center   8/1/2025  2:30 PM Pati Mustafa PA-C [facility]   10/28/2025  3:30 PM Slavik-Bosworth, Kelly J, APRN - CNP [facility]       **If hasn't been seen in over a year OR hasn't followed up according to last diabetes/ADHD visit, make appointment for patient before sending refill to provider.    Rx requested:  Requested Prescriptions     Pending Prescriptions Disp Refills    ondansetron (ZOFRAN-ODT) 4 MG disintegrating tablet [Pharmacy Med Name: ONDANSETRON ODT 4 MG TABLET] 90 tablet 2     Sig: TAKE 1 TABLET BY MOUTH 3 TIMES DAILY AS NEEDED FOR NAUSEA OR VOMITING

## 2025-05-24 DIAGNOSIS — R68.82 DECREASED LIBIDO: ICD-10-CM

## 2025-05-24 LAB
SHBG SERPL-SCNC: 39 NMOL/L (ref 19–76)
TESTOST FREE SERPL-MCNC: 33.2 PG/ML (ref 47–244)
TESTOST SERPL-MCNC: 194 NG/DL (ref 193–740)
VITAMIN D 25-HYDROXY: 16.9 NG/ML (ref 30–100)

## 2025-05-26 ENCOUNTER — RESULTS FOLLOW-UP (OUTPATIENT)
Age: 55
End: 2025-05-26

## 2025-06-04 DIAGNOSIS — E55.9 VITAMIN D DEFICIENCY: ICD-10-CM

## 2025-06-04 DIAGNOSIS — R79.89 ABNORMALITY OF TESTOSTERONE: ICD-10-CM

## 2025-06-04 DIAGNOSIS — R73.09 ABNORMAL GLUCOSE: ICD-10-CM

## 2025-06-04 DIAGNOSIS — E78.2 MIXED HYPERLIPIDEMIA: Primary | ICD-10-CM

## 2025-06-14 DIAGNOSIS — R79.89 ABNORMALITY OF TESTOSTERONE: ICD-10-CM

## 2025-06-14 DIAGNOSIS — E55.9 VITAMIN D DEFICIENCY: ICD-10-CM

## 2025-06-14 DIAGNOSIS — E78.2 MIXED HYPERLIPIDEMIA: ICD-10-CM

## 2025-06-14 DIAGNOSIS — R73.09 ABNORMAL GLUCOSE: ICD-10-CM

## 2025-06-14 LAB
CHOLEST SERPL-MCNC: 191 MG/DL (ref 0–199)
ESTIMATED AVERAGE GLUCOSE: 126 MG/DL
HBA1C MFR BLD: 6 % (ref 4–6)
HDLC SERPL-MCNC: 34 MG/DL (ref 40–59)
LDL CHOLESTEROL: 140 MG/DL (ref 0–129)
SHBG SERPL-SCNC: 38 NMOL/L (ref 19–76)
TESTOST FREE SERPL-MCNC: 36 PG/ML (ref 47–244)
TESTOST SERPL-MCNC: 206 NG/DL (ref 193–740)
TRIGLYCERIDE, FASTING: 83 MG/DL (ref 0–150)
VITAMIN D 25-HYDROXY: 14.3 NG/ML (ref 30–100)

## 2025-06-20 ENCOUNTER — RESULTS FOLLOW-UP (OUTPATIENT)
Age: 55
End: 2025-06-20

## 2025-06-20 DIAGNOSIS — R79.89 LOW TESTOSTERONE: Primary | ICD-10-CM

## 2025-06-20 DIAGNOSIS — E55.9 VITAMIN D DEFICIENCY: ICD-10-CM

## 2025-06-20 RX ORDER — IRON HEME POLYPEPTIDE/FOLIC AC 12-1MG
5000 TABLET ORAL DAILY
Qty: 90 CAPSULE | Refills: 1 | Status: SHIPPED | OUTPATIENT
Start: 2025-06-20

## 2025-06-25 RX ORDER — ROSUVASTATIN CALCIUM 5 MG/1
5 TABLET, COATED ORAL NIGHTLY
Qty: 30 TABLET | Refills: 5 | Status: SHIPPED | OUTPATIENT
Start: 2025-06-25

## 2025-08-11 DIAGNOSIS — Z72.0 TOBACCO ABUSE: ICD-10-CM

## 2025-08-12 DIAGNOSIS — R11.2 INTRACTABLE NAUSEA AND VOMITING: ICD-10-CM

## 2025-08-12 RX ORDER — NICOTINE 21 MG/24HR
PATCH, TRANSDERMAL 24 HOURS TRANSDERMAL
Qty: 42 PATCH | Refills: 0 | Status: SHIPPED | OUTPATIENT
Start: 2025-08-12

## 2025-08-12 RX ORDER — ONDANSETRON 4 MG/1
4 TABLET, ORALLY DISINTEGRATING ORAL 3 TIMES DAILY PRN
Qty: 90 TABLET | Refills: 2 | Status: SHIPPED | OUTPATIENT
Start: 2025-08-12

## (undated) DEVICE — ELECTRODE PT RET AD L9FT HI MOIST COND ADH HYDRGEL CORDED

## (undated) DEVICE — TIP COVER ACCESSORY

## (undated) DEVICE — TUBING INSUFFLATION SMK EVAC HI FLO SET PNEUMOCLEAR

## (undated) DEVICE — BINDER ABD UNISX 9IN 62IN L AND XL UNIV

## (undated) DEVICE — ARM DRAPE

## (undated) DEVICE — ENDO CARRY-ON PROCEDURE KIT: Brand: ENDO CARRY-ON PROCEDURE KIT

## (undated) DEVICE — HYPODERMIC SAFETY NEEDLE: Brand: MAGELLAN

## (undated) DEVICE — SEAL

## (undated) DEVICE — BLADE,CARBON-STEEL,11,STRL,DISPOSABLE,TB: Brand: MEDLINE

## (undated) DEVICE — SPONGE,LAP,18"X18",DLX,XR,ST,5/PK,40/PK: Brand: MEDLINE

## (undated) DEVICE — MARKER SURG SKIN GENTIAN VLT REG TIP W/ 6IN RUL DYNJSM01

## (undated) DEVICE — SYRINGE MED 10ML LUERLOCK TIP W/O SFTY DISP

## (undated) DEVICE — SOLUTION ANTIFOG VIS SYS CLEARIFY LAPSCP

## (undated) DEVICE — BLADELESS OBTURATOR: Brand: WECK VISTA

## (undated) DEVICE — SUTURE MCRYL SZ 4-0 L27IN ABSRB UD L19MM PS-2 1/2 CIR PRIM Y426H

## (undated) DEVICE — TOWEL,OR,DSP,ST,BLUE,STD,4/PK,20PK/CS: Brand: MEDLINE

## (undated) DEVICE — COLUMN DRAPE

## (undated) DEVICE — APPLICATOR MEDICATED 26 CC SOLUTION HI LT ORNG CHLORAPREP

## (undated) DEVICE — COUNTER NDL 40 COUNT HLD 70 FOAM BLK ADH W/ MAG

## (undated) DEVICE — ELECTRO LUBE IS A SINGLE PATIENT USE DEVICE THAT IS INTENDED TO BE USED ON ELECTROSURGICAL ELECTRODES TO REDUCE STICKING.: Brand: KEY SURGICAL ELECTRO LUBE

## (undated) DEVICE — GOWN,AURORA,NONREINFORCED,LARGE: Brand: MEDLINE

## (undated) DEVICE — BRUSH ENDO CLN L90.5IN SHTH DIA1.7MM BRIST DIA5-7MM 2-6MM

## (undated) DEVICE — SWABS COTTON OB/GYN W/RAYON TIP 8 IN 2 PER PK

## (undated) DEVICE — ADAPTER FLSH PMP FLD MGMT GI IRRIG OFP 2 DISPOSABLE

## (undated) DEVICE — COVER LT HNDL BLU PLAS

## (undated) DEVICE — LABEL MED MINI W/ MARKER

## (undated) DEVICE — SINGLE PORT MANIFOLD: Brand: NEPTUNE 2

## (undated) DEVICE — GLOVE ORANGE PI 7 1/2   MSG9075

## (undated) DEVICE — GAUZE,SPONGE,4"X4",16PLY,XRAY,STRL,LF: Brand: MEDLINE

## (undated) DEVICE — TUBE SET 96 MM 64 MM H2O PERISTALTIC STD AUX CHANNEL

## (undated) DEVICE — DRAPE,LAP,CHOLE,W/TROUGHS,STERILE: Brand: MEDLINE

## (undated) DEVICE — NEEDLE INSUF L120MM ULT VERES ENDOPATH

## (undated) DEVICE — LIQUIBAND RAPID ADHESIVE 36/CS 0.8ML: Brand: MEDLINE

## (undated) DEVICE — TUBING, SUCTION, 1/4" X 10', STRAIGHT: Brand: MEDLINE

## (undated) DEVICE — SUTURE SPIRAL 2-0 SH STRATAFIX

## (undated) DEVICE — SYRINGE MED 30ML STD CLR PLAS LUERLOCK TIP N CTRL DISP

## (undated) DEVICE — WARMER SCP 2 ANTIFOG LAP DISP

## (undated) DEVICE — SHEET,DRAPE,53X77,STERILE: Brand: MEDLINE

## (undated) DEVICE — SUTURE STRATAFIX SPIRAL 34 PD V DISPOSABLE SNGLE USE VIOLET

## (undated) DEVICE — PACK,BASIC: Brand: MEDLINE

## (undated) DEVICE — Device: Brand: ENDO SMARTCAP

## (undated) DEVICE — GOWN,AURORA,NONRNF,XL,30/CS: Brand: MEDLINE